# Patient Record
Sex: MALE | Race: WHITE | NOT HISPANIC OR LATINO | Employment: FULL TIME | URBAN - METROPOLITAN AREA
[De-identification: names, ages, dates, MRNs, and addresses within clinical notes are randomized per-mention and may not be internally consistent; named-entity substitution may affect disease eponyms.]

---

## 2023-02-24 PROBLEM — M25.552 BILATERAL HIP PAIN: Status: ACTIVE | Noted: 2023-02-24

## 2023-02-24 PROBLEM — L50.0 ALLERGIC URTICARIA: Status: ACTIVE | Noted: 2023-02-24

## 2023-02-24 PROBLEM — M25.551 BILATERAL HIP PAIN: Status: ACTIVE | Noted: 2023-02-24

## 2023-02-24 PROBLEM — Z78.9 KNOWN MEDICAL PROBLEMS: Status: ACTIVE | Noted: 2023-02-24

## 2023-02-24 PROBLEM — M70.30 BURSITIS OF ELBOW: Status: ACTIVE | Noted: 2023-02-24

## 2023-02-24 PROBLEM — L50.9 URTICARIA: Status: ACTIVE | Noted: 2023-02-24

## 2023-02-24 PROBLEM — M70.62 TROCHANTERIC BURSITIS OF LEFT HIP: Status: ACTIVE | Noted: 2023-02-24

## 2023-02-24 RX ORDER — MELOXICAM 15 MG/1
15 TABLET ORAL DAILY
COMMUNITY
End: 2023-09-18 | Stop reason: ALTCHOICE

## 2023-03-20 ENCOUNTER — OFFICE VISIT (OUTPATIENT)
Dept: PRIMARY CARE | Facility: CLINIC | Age: 63
End: 2023-03-20
Payer: COMMERCIAL

## 2023-03-20 VITALS
DIASTOLIC BLOOD PRESSURE: 74 MMHG | WEIGHT: 282 LBS | HEART RATE: 76 BPM | BODY MASS INDEX: 38.19 KG/M2 | HEIGHT: 72 IN | TEMPERATURE: 98.2 F | OXYGEN SATURATION: 96 % | SYSTOLIC BLOOD PRESSURE: 122 MMHG | RESPIRATION RATE: 16 BRPM

## 2023-03-20 DIAGNOSIS — E78.2 MIXED HYPERLIPIDEMIA: Primary | ICD-10-CM

## 2023-03-20 DIAGNOSIS — F17.210 CIGARETTE NICOTINE DEPENDENCE WITHOUT COMPLICATION: ICD-10-CM

## 2023-03-20 DIAGNOSIS — M70.62 TROCHANTERIC BURSITIS OF LEFT HIP: ICD-10-CM

## 2023-03-20 DIAGNOSIS — M16.12 PRIMARY OSTEOARTHRITIS OF LEFT HIP: ICD-10-CM

## 2023-03-20 PROCEDURE — 99213 OFFICE O/P EST LOW 20 MIN: CPT | Performed by: FAMILY MEDICINE

## 2023-03-20 PROCEDURE — 4004F PT TOBACCO SCREEN RCVD TLK: CPT | Performed by: FAMILY MEDICINE

## 2023-03-20 RX ORDER — NABUMETONE 750 MG/1
750 TABLET, FILM COATED ORAL
Qty: 60 TABLET | Refills: 0 | Status: SHIPPED | OUTPATIENT
Start: 2023-03-20 | End: 2023-04-19

## 2023-03-20 ASSESSMENT — ENCOUNTER SYMPTOMS
NUMBNESS: 1
LOSS OF MOTION: 0
LOSS OF SENSATION: 0
TINGLING: 0
INABILITY TO BEAR WEIGHT: 0
HIP PAIN: 1
MUSCLE WEAKNESS: 0

## 2023-03-20 ASSESSMENT — PAIN SCALES - GENERAL: PAINLEVEL: 2

## 2023-03-20 ASSESSMENT — PATIENT HEALTH QUESTIONNAIRE - PHQ9
SUM OF ALL RESPONSES TO PHQ9 QUESTIONS 1 AND 2: 0
2. FEELING DOWN, DEPRESSED OR HOPELESS: NOT AT ALL
1. LITTLE INTEREST OR PLEASURE IN DOING THINGS: NOT AT ALL

## 2023-03-20 NOTE — PROGRESS NOTES
Conrado Parker is a 62 y.o. male who presents for Follow up on Hip Pain and Lab Results.    Hip Pain   The pain is present in the left hip. The quality of the pain is described as shooting. The pain is at a severity of 9/10. The pain has been Fluctuating since onset. Associated symptoms include numbness. Pertinent negatives include no inability to bear weight, loss of motion, loss of sensation, muscle weakness or tingling.      No past medical history on file.  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    Urticaria 02/24/2023    Bilateral hip pain 02/24/2023    Known medical problems 02/24/2023    Trochanteric bursitis of left hip 02/24/2023     Past Surgical History:   Procedure Laterality Date    OTHER SURGICAL HISTORY  05/28/2021    No history of surgery       Social History     Socioeconomic History    Marital status:      Spouse name: None    Number of children: None    Years of education: None    Highest education level: None   Occupational History    None   Tobacco Use    Smoking status: Every Day     Packs/day: 0.50     Types: Cigarettes    Smokeless tobacco: Never   Vaping Use    Vaping status: None   Substance and Sexual Activity    Alcohol use: Never    Drug use: Never    Sexual activity: None   Other Topics Concern    None   Social History Narrative    None     Social Determinants of Health     Financial Resource Strain: Not on file   Food Insecurity: Not on file   Transportation Needs: Not on file   Physical Activity: Not on file   Stress: Not on file   Social Connections: Not on file   Intimate Partner Violence: Not on file   Housing Stability: Not on file     Current Outpatient Medications   Medication Sig Dispense Refill    meloxicam (Mobic) 15 mg tablet Take 1 tablet (15 mg) by mouth once daily.      nabumetone (Relafen) 750 mg tablet Take 1 tablet (750 mg) by mouth in the morning  and 1 tablet (750 mg) in the evening. Take with meals. 60 tablet 0     No current facility-administered medications for this visit.     Current Outpatient Medications on File Prior to Visit   Medication Sig Dispense Refill    meloxicam (Mobic) 15 mg tablet Take 1 tablet (15 mg) by mouth once daily.       No current facility-administered medications on file prior to visit.     No Known Allergies    Review of Systems - General ROS: negative for - fatigue, fever, malaise, night sweats or weight loss  ENT ROS: negative for - hearing change, nasal discharge, oral lesions, sinus pain, sore throat, tinnitus or vertigo  Allergy and Immunology ROS: negative for - hives, nasal congestion or seasonal allergies  Respiratory ROS: negative for - cough, hemoptysis, pleuritic pain, shortness of breath or wheezing  Cardiovascular ROS: no chest pain or dyspnea on exertion, palpitations, PND or orthopnea.  No syncope.  Gastrointestinal ROS: no abdominal pain, change in bowel habits, or black or bloody stools  Genito-Urinary ROS: no dysuria, trouble voiding, or hematuria  Dermatological ROS: negative for - dry skin, lumps, pruritus or rash  Neurological ROS: negative for - dizziness, gait disturbance, headaches, impaired coordination/balance, numbness/tingling, tremors or visual changes  Psychological ROS: negative for - anxiety, concentration difficulties, depression, memory difficulties or sleep disturbances  Endocrine ROS: negative for - hot flashes, malaise/lethargy, palpitations, polydipsia/polyuria, skin changes, temperature intolerance   Hematological and Lymphatic ROS: negative for - bruising, night sweats or pallor    Blood pressure 122/74, pulse 76, temperature 36.8 °C (98.2 °F), resp. rate 16, height 1.829 m (6'), weight 128 kg (282 lb), SpO2 96 %.    Physical Examination: General appearance - alert, well appearing, and in no distress  HEENT EOMI, PEERLA, normal eyelids.  Oropharynx no erythema or exudate.  Normal tonsils.     Ears -external auditory canal normal bilaterally.  Tympanic membranes normal bilaterally.  Mouth - mucous membranes moist, pharynx normal without lesions  Neck - supple, trachea central no thyromegaly.  No significant adenopathy  Chest -good air entry bilaterally, no rhonchi rales and no wheezes.  Heart -normal S1 and S2, regular rate and rhythm with no murmurs gallop or rub.  Abdomen -nondistended, soft, nontender with no guarding, no palpable mass and no CVA tenderness.  Bowel sounds normal.  No hernia.  Neurological - alert, oriented, cranial nerves II through XII normal.  Reflexes 2+ bilaterally.  No focal deficit.  Extremities: peripheral pulses normal, no clubbing or cyanosis.  Right hip:  normal   Left hip:  positives: pain elicited with heel impact, pain with movement of hip, and tenderness over greater trochanter and negatives: pulses full       Legacy Encounter on 02/16/2023   Component Date Value Ref Range Status    Glucose 02/16/2023 96  74 - 99 mg/dL Final    Sodium 02/16/2023 138  136 - 145 mmol/L Final    Potassium 02/16/2023 4.0  3.5 - 5.3 mmol/L Final    Chloride 02/16/2023 104  98 - 107 mmol/L Final    Bicarbonate 02/16/2023 26  21 - 32 mmol/L Final    Anion Gap 02/16/2023 12  10 - 20 mmol/L Final    Urea Nitrogen 02/16/2023 16  6 - 23 mg/dL Final    Creatinine 02/16/2023 1.07  0.50 - 1.30 mg/dL Final    GFR MALE 02/16/2023 79  >90 mL/min/1.73m2 Final    Comment:  CALCULATIONS OF ESTIMATED GFR ARE PERFORMED   USING THE 2021 CKD-EPI STUDY REFIT EQUATION   WITHOUT THE RACE VARIABLE FOR THE IDMS-TRACEABLE   CREATININE METHODS.    https://jasn.asnjournals.org/content/early/2021/09/22/ASN.5910430092      Calcium 02/16/2023 9.1  8.6 - 10.3 mg/dL Final    Albumin 02/16/2023 4.0  3.4 - 5.0 g/dL Final    Alkaline Phosphatase 02/16/2023 70  33 - 136 U/L Final    Total Protein 02/16/2023 7.2  6.4 - 8.2 g/dL Final    AST 02/16/2023 20  9 - 39 U/L Final    Total Bilirubin 02/16/2023 0.6  0.0 - 1.2 mg/dL Final     ALT (SGPT) 02/16/2023 20  10 - 52 U/L Final    Comment:  Patients treated with Sulfasalazine may generate    falsely decreased results for ALT.      WBC 02/16/2023 10.2  4.4 - 11.3 x10E9/L Final    RBC 02/16/2023 5.73  4.50 - 5.90 x10E12/L Final    Hemoglobin 02/16/2023 16.6  13.5 - 17.5 g/dL Final    Hematocrit 02/16/2023 52.5 (H)  41.0 - 52.0 % Final    MCV 02/16/2023 92  80 - 100 fL Final    MCHC 02/16/2023 31.6 (L)  32.0 - 36.0 g/dL Final    Platelets 02/16/2023 253  150 - 450 x10E9/L Final    RDW 02/16/2023 13.7  11.5 - 14.5 % Final    Neutrophils % 02/16/2023 54.7  40.0 - 80.0 % Final    Immature Granulocytes %, Automated 02/16/2023 0.4  0.0 - 0.9 % Final    Comment:  Immature Granulocyte Count (IG) includes promyelocytes,    myelocytes and metamyelocytes but does not include bands.   Percent differential counts (%) should be interpreted in the   context of the absolute cell counts (cells/L).      Lymphocytes % 02/16/2023 36.0  13.0 - 44.0 % Final    Monocytes % 02/16/2023 6.8  2.0 - 10.0 % Final    Eosinophils % 02/16/2023 1.6  0.0 - 6.0 % Final    Basophils % 02/16/2023 0.5  0.0 - 2.0 % Final    Neutrophils Absolute 02/16/2023 5.60  1.20 - 7.70 x10E9/L Final    Lymphocytes Absolute 02/16/2023 3.69  1.20 - 4.80 x10E9/L Final    Monocytes Absolute 02/16/2023 0.70  0.10 - 1.00 x10E9/L Final    Eosinophils Absolute 02/16/2023 0.16  0.00 - 0.70 x10E9/L Final    Basophils Absolute 02/16/2023 0.05  0.00 - 0.10 x10E9/L Final    Prostate Specific Antigen,Screen 02/16/2023 0.84  0.00 - 4.00 ng/mL Final    Comment: The FDA requires that the method used for PSA assay be   reported to the physician. Values obtained with different   assay methods must not be used interchangeably. This test  was performed at [facility] using the Access   Hybritech PSA assay is a two-site immunoenzymatic sandwich   assay. The assay is approved for measurement of   prostate-specific antigen (PSA)in serum and may be used   in  conjunction with a digital rectal examination in men   50 years and older as an aid in detection of prostate   cancer.  5-Alpha-reductase inhibitors (e.g. Proscar, Finasteride,   Avodart, Dutasteride and Anita) for the treatment of BPH   have been shown to lower PSA levels by an average of 50%   after 6 months of treatment.      Cholesterol 02/16/2023 213 (H)  0 - 199 mg/dL Final    Comment: .      AGE      DESIRABLE   BORDERLINE HIGH   HIGH     0-19 Y     0 - 169       170 - 199     >/= 200    20-24 Y     0 - 189       190 - 224     >/= 225         >24 Y     0 - 199       200 - 239     >/= 240   **All ranges are based on fasting samples. Specific   therapeutic targets will vary based on patient-specific   cardiac risk.  .   Pediatric guidelines reference:Pediatrics 2011, 128(S5).   Adult guidelines reference: NCEP ATPIII Guidelines,     EVA 2001, 258:3666-97  .   Venipuncture immediately after or during the    administration of Metamizole may lead to falsely   low results. Testing should be performed immediately   prior to Metamizole dosing.      HDL 02/16/2023 43.8  mg/dL Final    Comment: .      AGE      VERY LOW   LOW     NORMAL    HIGH       0-19 Y       < 35   < 40     40-45     ----    20-24 Y       ----   < 40       >45     ----      >24 Y       ----   < 40     40-60      >60  .      Cholesterol/HDL Ratio 02/16/2023 4.9   Final    Comment: REF VALUES  DESIRABLE  < 3.4  HIGH RISK  > 5.0      LDL 02/16/2023 146 (H)  0 - 99 mg/dL Final    Comment: .                           NEAR      BORD      AGE      DESIRABLE  OPTIMAL    HIGH     HIGH     VERY HIGH     0-19 Y     0 - 109     ---    110-129   >/= 130     ----    20-24 Y     0 - 119     ---    120-159   >/= 160     ----      >24 Y     0 -  99   100-129  130-159   160-189     >/=190  .      VLDL 02/16/2023 23  0 - 40 mg/dL Final    Triglycerides 02/16/2023 114  0 - 149 mg/dL Final    Comment: .      AGE      DESIRABLE   BORDERLINE HIGH   HIGH     VERY HIGH    0 D-90 D    19 - 174         ----         ----        ----  91 D- 9 Y     0 -  74        75 -  99     >/= 100      ----    10-19 Y     0 -  89        90 - 129     >/= 130      ----    20-24 Y     0 - 114       115 - 149     >/= 150      ----         >24 Y     0 - 149       150 - 199    200- 499    >/= 500  .   Venipuncture immediately after or during the    administration of Metamizole may lead to falsely   low results. Testing should be performed immediately   prior to Metamizole dosing.       Problem List Items Addressed This Visit       Trochanteric bursitis of left hip    Current Assessment & Plan     1.  I explained the condition to the patient.  2.  All questions answered.   3.  We discussed the option of using a steroid injection to settle the inflammation.  4.  I recommended regular icing for 20 min, three times per day.    5.  I offered the use of anti-inflammatories relafen  6.  I gave the patient a handout on this condition and instructed them on the exercises.  Increasing flexibility should help take the tension off of the iliotibial band as it crosses the greater trochanter.   7.  Follow up in a few weeks and as needed.         Relevant Medications    nabumetone (Relafen) 750 mg tablet    Other Relevant Orders    Referral to Orthopaedic Surgery    Mixed hyperlipidemia - Primary    Current Assessment & Plan     The nature of cardiac risk has been fully discussed with this patient. I have made  aware of  LDL target goal given  cardiovascular risk analysis. I have discussed the appropriate diet. The need for lifelong compliance in order to reduce risk is stressed. A regular exercise program is recommended to help achieve and maintain normal body weight, fitness and improve lipid balance. A written copy of a low fat, low cholesterol diet has been given to the patient.  Patient education provided. They understand and agree with this course of treatment. They will return with new or worsening symptoms.  Patient instructed to remain current with appropriate annual health maintenance.            Relevant Orders    Lipid Panel    Follow Up In [facility] - PCP     Other Visit Diagnoses       Primary osteoarthritis of left hip        Relevant Medications    nabumetone (Relafen) 750 mg tablet    Other Relevant Orders    Referral to Orthopaedic Surgery    Cigarette nicotine dependence without complication        Relevant Orders    CT lung screening low dose              Scribe Attestation  By signing my name below, IEugene Scribe   attest that this documentation has been prepared under the direction and in the presence of Win Overton MD.

## 2023-03-20 NOTE — ASSESSMENT & PLAN NOTE
The nature of cardiac risk has been fully discussed with this patient. I have made  aware of  LDL target goal given  cardiovascular risk analysis. I have discussed the appropriate diet. The need for lifelong compliance in order to reduce risk is stressed. A regular exercise program is recommended to help achieve and maintain normal body weight, fitness and improve lipid balance. A written copy of a low fat, low cholesterol diet has been given to the patient.  Patient education provided. They understand and agree with this course of treatment. They will return with new or worsening symptoms. Patient instructed to remain current with appropriate annual health maintenance.

## 2023-03-21 NOTE — ASSESSMENT & PLAN NOTE
1.  I explained the condition to the patient.  2.  All questions answered.   3.  We discussed the option of using a steroid injection to settle the inflammation.  4.  I recommended regular icing for 20 min, three times per day.    5.  I offered the use of anti-inflammatories relafen  6.  I gave the patient a handout on this condition and instructed them on the exercises.  Increasing flexibility should help take the tension off of the iliotibial band as it crosses the greater trochanter.   7.  Follow up in a few weeks and as needed.

## 2023-09-18 ENCOUNTER — OFFICE VISIT (OUTPATIENT)
Dept: PRIMARY CARE | Facility: CLINIC | Age: 63
End: 2023-09-18
Payer: COMMERCIAL

## 2023-09-18 VITALS
HEIGHT: 72 IN | HEART RATE: 87 BPM | BODY MASS INDEX: 39.68 KG/M2 | DIASTOLIC BLOOD PRESSURE: 72 MMHG | RESPIRATION RATE: 18 BRPM | SYSTOLIC BLOOD PRESSURE: 118 MMHG | WEIGHT: 293 LBS | OXYGEN SATURATION: 96 % | TEMPERATURE: 98.1 F

## 2023-09-18 DIAGNOSIS — E66.01 CLASS 2 SEVERE OBESITY DUE TO EXCESS CALORIES WITH SERIOUS COMORBIDITY AND BODY MASS INDEX (BMI) OF 39.0 TO 39.9 IN ADULT (MULTI): ICD-10-CM

## 2023-09-18 DIAGNOSIS — E78.2 MIXED HYPERLIPIDEMIA: ICD-10-CM

## 2023-09-18 DIAGNOSIS — M16.12 PRIMARY OSTEOARTHRITIS OF LEFT HIP: ICD-10-CM

## 2023-09-18 PROBLEM — E66.812 CLASS 2 SEVERE OBESITY DUE TO EXCESS CALORIES WITH SERIOUS COMORBIDITY AND BODY MASS INDEX (BMI) OF 39.0 TO 39.9 IN ADULT: Status: ACTIVE | Noted: 2023-09-18

## 2023-09-18 PROCEDURE — 3008F BODY MASS INDEX DOCD: CPT | Performed by: FAMILY MEDICINE

## 2023-09-18 PROCEDURE — 99213 OFFICE O/P EST LOW 20 MIN: CPT | Performed by: FAMILY MEDICINE

## 2023-09-18 PROCEDURE — 4004F PT TOBACCO SCREEN RCVD TLK: CPT | Performed by: FAMILY MEDICINE

## 2023-09-18 RX ORDER — DICLOFENAC SODIUM 75 MG/1
75 TABLET, DELAYED RELEASE ORAL 2 TIMES DAILY PRN
Qty: 60 TABLET | Refills: 1 | Status: SHIPPED | OUTPATIENT
Start: 2023-09-18

## 2023-09-18 ASSESSMENT — PATIENT HEALTH QUESTIONNAIRE - PHQ9
1. LITTLE INTEREST OR PLEASURE IN DOING THINGS: NOT AT ALL
SUM OF ALL RESPONSES TO PHQ9 QUESTIONS 1 AND 2: 0
2. FEELING DOWN, DEPRESSED OR HOPELESS: NOT AT ALL

## 2023-09-18 NOTE — ASSESSMENT & PLAN NOTE
Natural history and expected course discussed. Questions answered.  Educational materials distributed.  NSAIDs per medication orders.  The risks and benefits of my recommendations, as well as other treatment options were discussed with the patient today.  The risk including GI side effects like NSAIDs gastropathy and upper GI bleed and also kidney disease were discussed with him.  Call if symptoms fail to improve as expected.    Follow-up if persistent or worsening symptoms otherwise when necessary.

## 2023-09-18 NOTE — PATIENT INSTRUCTIONS
BMI was above normal measurement. Current weight: 133 kg (293 lb)  Weight change since last visit (-) denotes wt loss 11 lbs   Weight loss needed to achieve BMI 25: 109.1 Lbs  Weight loss needed to achieve BMI 30: 72.3 Lbs  Advised to Increase physical activity.

## 2023-09-18 NOTE — PROGRESS NOTES
Chief Complaint   Patient presents with    Follow-up     Hyperlipidemia    Hip Pain     He presents for follow up on Hyperlipidemia, and other medical conditions noted below. He indicates that he is feeling well and denies any symptoms referable to elevated cholesterol or other medical conditions. Specifically denies chest pain, palpitations, dyspnea, orthopnea, PND or peripheral edema.     Patient complains of left hip pain. Onset of the symptoms was several months ago. Inciting event:  unknown . The patient reports the hip pain is worse with weight bearing and is aggravated by walking. Associated symptoms: none. Aggravating symptoms include: any weight bearing, going up and down stairs, rising after sitting, and walking. Patient has had prior hip problems. Evaluation to date: plain films, which were abnormal  bursitis and arthritis  and an Orthopedics consult, PT Treatment to date:  cortisone .  He was referred to the orthopedic specialist and had cortisone injection with no improvement in his symptoms.  He was also prescribed medication with no improvement.  He does not want to return to the orthopedist.  He would like to try different anti-inflammatory medication.    History reviewed. No pertinent past medical history.  Patient Active Problem List    Diagnosis Date Noted    Primary osteoarthritis of left hip 09/18/2023    Class 2 severe obesity due to excess calories with serious comorbidity and body mass index (BMI) of 39.0 to 39.9 in adult (CMS/[facility]) 09/18/2023    Mixed hyperlipidemia 03/20/2023    Bursitis of elbow 02/24/2023    Allergic urticaria 02/24/2023    Urticaria 02/24/2023    Bilateral hip pain 02/24/2023    Known medical problems 02/24/2023    Trochanteric bursitis of left hip 02/24/2023     Past Surgical History:   Procedure Laterality Date    OTHER SURGICAL HISTORY  05/28/2021    No history of surgery     No family history on file.    Social History     Socioeconomic History    Marital status:       Spouse name: None    Number of children: None    Years of education: None    Highest education level: None   Occupational History    None   Tobacco Use    Smoking status: Every Day     Packs/day: .5     Types: Cigarettes    Smokeless tobacco: Never   Vaping Use    Vaping Use: None   Substance and Sexual Activity    Alcohol use: Never    Drug use: Never    Sexual activity: Defer   Other Topics Concern    None   Social History Narrative    None     Social Determinants of Health     Financial Resource Strain: Not on file   Food Insecurity: Not on file   Transportation Needs: Not on file   Physical Activity: Not on file   Stress: Not on file   Social Connections: Not on file   Intimate Partner Violence: Not on file   Housing Stability: Not on file     Current Outpatient Medications   Medication Sig Dispense Refill    diclofenac (Voltaren) 75 mg EC tablet Take 1 tablet (75 mg) by mouth 2 times a day as needed (pain with food). Take with food, do not crush, chew, or split. 60 tablet 1     No current facility-administered medications for this visit.     Current Outpatient Medications on File Prior to Visit   Medication Sig Dispense Refill    [DISCONTINUED] meloxicam (Mobic) 15 mg tablet Take 1 tablet (15 mg) by mouth once daily.       No current facility-administered medications on file prior to visit.     No Known Allergies       Review of Systems - General ROS: negative for - fatigue, fever, malaise, night sweats or weight loss  ENT ROS: negative for - hearing change, nasal discharge, oral lesions, sinus pain, sore throat, tinnitus or vertigo  Allergy and Immunology ROS: negative for - hives, nasal congestion or seasonal allergies  Respiratory ROS: negative for - cough, hemoptysis, pleuritic pain, shortness of breath or wheezing  Cardiovascular ROS: no chest pain or dyspnea on exertion, palpitations, PND or orthopnea.  No syncope.  Gastrointestinal ROS: no abdominal pain, change in bowel habits, or black or  bloody stools  Genito-Urinary ROS: no dysuria, trouble voiding, or hematuria  Dermatological ROS: negative for - dry skin, lumps, pruritus or rash  Neurological ROS: negative for - dizziness, gait disturbance, headaches, impaired coordination/balance, numbness/tingling, tremors or visual changes  Hematological and Lymphatic ROS: negative for - bruising, night sweats or pallor    Blood pressure 118/72, pulse 87, temperature 36.7 °C (98.1 °F), resp. rate 18, height 1.829 m (6'), weight 133 kg (293 lb), SpO2 96 %.    Physical Examination: General appearance - alert, well appearing, and in no distress  HEENT EOMI, PEERLA, normal eyelids.  Oropharynx no erythema or exudate.  Normal tonsils.    Ears -external auditory canal normal bilaterally.  Tympanic membranes normal bilaterally.  Mouth - mucous membranes moist, pharynx normal without lesions  Neck - supple, trachea central no thyromegaly.  No significant adenopathy  Chest -good air entry bilaterally, no rhonchi rales and no wheezes.  Heart -normal S1 and S2, regular rate and rhythm with no murmurs gallop or rub.  Abdomen -nondistended, soft, nontender with no guarding, no palpable mass and no CVA tenderness.  Bowel sounds normal.  No hernia.  Extremities: peripheral pulses normal, no clubbing or cyanosis.  Right hip:  full painless range of motion, without tenderness   Left hip:  positives: decreased ROM, pain elicited with heel impact, and pain with movement of hip and negatives: no tenderness           Legacy Encounter on 02/16/2023   Component Date Value Ref Range Status    Glucose 02/16/2023 96  74 - 99 mg/dL Final    Sodium 02/16/2023 138  136 - 145 mmol/L Final    Potassium 02/16/2023 4.0  3.5 - 5.3 mmol/L Final    Chloride 02/16/2023 104  98 - 107 mmol/L Final    Bicarbonate 02/16/2023 26  21 - 32 mmol/L Final    Anion Gap 02/16/2023 12  10 - 20 mmol/L Final    Urea Nitrogen 02/16/2023 16  6 - 23 mg/dL Final    Creatinine 02/16/2023 1.07  0.50 - 1.30 mg/dL  Final    GFR MALE 02/16/2023 79  >90 mL/min/1.73m2 Final    Comment:  CALCULATIONS OF ESTIMATED GFR ARE PERFORMED   USING THE 2021 CKD-EPI STUDY REFIT EQUATION   WITHOUT THE RACE VARIABLE FOR THE IDMS-TRACEABLE   CREATININE METHODS.    https://jasn.asnjournals.org/content/early/2021/09/22/ASN.3276787518      Calcium 02/16/2023 9.1  8.6 - 10.3 mg/dL Final    Albumin 02/16/2023 4.0  3.4 - 5.0 g/dL Final    Alkaline Phosphatase 02/16/2023 70  33 - 136 U/L Final    Total Protein 02/16/2023 7.2  6.4 - 8.2 g/dL Final    AST 02/16/2023 20  9 - 39 U/L Final    Total Bilirubin 02/16/2023 0.6  0.0 - 1.2 mg/dL Final    ALT (SGPT) 02/16/2023 20  10 - 52 U/L Final    Comment:  Patients treated with Sulfasalazine may generate    falsely decreased results for ALT.      WBC 02/16/2023 10.2  4.4 - 11.3 x10E9/L Final    RBC 02/16/2023 5.73  4.50 - 5.90 x10E12/L Final    Hemoglobin 02/16/2023 16.6  13.5 - 17.5 g/dL Final    Hematocrit 02/16/2023 52.5 (H)  41.0 - 52.0 % Final    MCV 02/16/2023 92  80 - 100 fL Final    MCHC 02/16/2023 31.6 (L)  32.0 - 36.0 g/dL Final    Platelets 02/16/2023 253  150 - 450 x10E9/L Final    RDW 02/16/2023 13.7  11.5 - 14.5 % Final    Neutrophils % 02/16/2023 54.7  40.0 - 80.0 % Final    Immature Granulocytes %, Automated 02/16/2023 0.4  0.0 - 0.9 % Final    Comment:  Immature Granulocyte Count (IG) includes promyelocytes,    myelocytes and metamyelocytes but does not include bands.   Percent differential counts (%) should be interpreted in the   context of the absolute cell counts (cells/L).      Lymphocytes % 02/16/2023 36.0  13.0 - 44.0 % Final    Monocytes % 02/16/2023 6.8  2.0 - 10.0 % Final    Eosinophils % 02/16/2023 1.6  0.0 - 6.0 % Final    Basophils % 02/16/2023 0.5  0.0 - 2.0 % Final    Neutrophils Absolute 02/16/2023 5.60  1.20 - 7.70 x10E9/L Final    Lymphocytes Absolute 02/16/2023 3.69  1.20 - 4.80 x10E9/L Final    Monocytes Absolute 02/16/2023 0.70  0.10 - 1.00 x10E9/L Final    Eosinophils  Absolute 02/16/2023 0.16  0.00 - 0.70 x10E9/L Final    Basophils Absolute 02/16/2023 0.05  0.00 - 0.10 x10E9/L Final    Prostate Specific Antigen,Screen 02/16/2023 0.84  0.00 - 4.00 ng/mL Final    Comment: The FDA requires that the method used for PSA assay be   reported to the physician. Values obtained with different   assay methods must not be used interchangeably. This test  was performed at [facility] using the Access   Hybritech PSA assay is a two-site immunoenzymatic sandwich   assay. The assay is approved for measurement of   prostate-specific antigen (PSA)in serum and may be used   in conjunction with a digital rectal examination in men   50 years and older as an aid in detection of prostate   cancer.  5-Alpha-reductase inhibitors (e.g. Proscar, Finasteride,   Avodart, Dutasteride and Anita) for the treatment of BPH   have been shown to lower PSA levels by an average of 50%   after 6 months of treatment.      Cholesterol 02/16/2023 213 (H)  0 - 199 mg/dL Final    Comment: .      AGE      DESIRABLE   BORDERLINE HIGH   HIGH     0-19 Y     0 - 169       170 - 199     >/= 200    20-24 Y     0 - 189       190 - 224     >/= 225         >24 Y     0 - 199       200 - 239     >/= 240   **All ranges are based on fasting samples. Specific   therapeutic targets will vary based on patient-specific   cardiac risk.  .   Pediatric guidelines reference:Pediatrics 2011, 128(S5).   Adult guidelines reference: NCEP ATPIII Guidelines,     EVA 2001, 258:2486-97  .   Venipuncture immediately after or during the    administration of Metamizole may lead to falsely   low results. Testing should be performed immediately   prior to Metamizole dosing.      HDL 02/16/2023 43.8  mg/dL Final    Comment: .      AGE      VERY LOW   LOW     NORMAL    HIGH       0-19 Y       < 35   < 40     40-45     ----    20-24 Y       ----   < 40       >45     ----      >24 Y       ----   < 40     40-60      >60  .      Cholesterol/HDL  Ratio 02/16/2023 4.9   Final    Comment: REF VALUES  DESIRABLE  < 3.4  HIGH RISK  > 5.0      LDL 02/16/2023 146 (H)  0 - 99 mg/dL Final    Comment: .                           NEAR      BORD      AGE      DESIRABLE  OPTIMAL    HIGH     HIGH     VERY HIGH     0-19 Y     0 - 109     ---    110-129   >/= 130     ----    20-24 Y     0 - 119     ---    120-159   >/= 160     ----      >24 Y     0 -  99   100-129  130-159   160-189     >/=190  .      VLDL 02/16/2023 23  0 - 40 mg/dL Final    Triglycerides 02/16/2023 114  0 - 149 mg/dL Final    Comment: .      AGE      DESIRABLE   BORDERLINE HIGH   HIGH     VERY HIGH   0 D-90 D    19 - 174         ----         ----        ----  91 D- 9 Y     0 -  74        75 -  99     >/= 100      ----    10-19 Y     0 -  89        90 - 129     >/= 130      ----    20-24 Y     0 - 114       115 - 149     >/= 150      ----         >24 Y     0 - 149       150 - 199    200- 499    >/= 500  .   Venipuncture immediately after or during the    administration of Metamizole may lead to falsely   low results. Testing should be performed immediately   prior to Metamizole dosing.         Problem List Items Addressed This Visit       Mixed hyperlipidemia    Current Assessment & Plan     The nature of cardiac risk has been fully discussed with this patient. Discussed cardiovascular risk analysis and appropriate diet with the need for lifelong measures to reduce the risk. A regular exercise program is recommended to help achieve and maintain normal body weight, fitness and improve lipid balance. Patient education provided. They understand and agree with this course of treatment. They will return with new or worsening symptoms. Patient instructed to remain current with appropriate annual health maintenance.            Primary osteoarthritis of left hip    Current Assessment & Plan     Natural history and expected course discussed. Questions answered.  Educational materials distributed.  NSAIDs per  medication orders.  The risks and benefits of my recommendations, as well as other treatment options were discussed with the patient today.  The risk including GI side effects like NSAIDs gastropathy and upper GI bleed and also kidney disease were discussed with him.  Call if symptoms fail to improve as expected.    Follow-up if persistent or worsening symptoms otherwise when necessary.             Relevant Medications    diclofenac (Voltaren) 75 mg EC tablet    Class 2 severe obesity due to excess calories with serious comorbidity and body mass index (BMI) of 39.0 to 39.9 in adult (CMS/[facility])    Current Assessment & Plan     Continue decrease calorie diet and not more than 1500 calorie per day diet and low-fat diet.  Continue with regular exercise program.  We advised exercise at least 5 days a week for at least 45 minutes and also a minimum of 10,000 steps a day.  The detrimental effects of obesity on health were discussed.          Scribe Attestation  By signing my name below, IEugene Scribe   attest that this documentation has been prepared under the direction and in the presence of Win Overton MD.

## 2023-11-28 ENCOUNTER — TELEPHONE (OUTPATIENT)
Dept: PRIMARY CARE | Facility: CLINIC | Age: 63
End: 2023-11-28
Payer: COMMERCIAL

## 2023-11-28 DIAGNOSIS — M70.62 TROCHANTERIC BURSITIS OF LEFT HIP: ICD-10-CM

## 2023-11-28 DIAGNOSIS — M16.12 PRIMARY OSTEOARTHRITIS OF LEFT HIP: ICD-10-CM

## 2023-11-28 NOTE — TELEPHONE ENCOUNTER
Umer called and said he's done PT and his left hip has only gotten worse and he was wondering If he can start the process for surgery and wanted to know if dr. Overton could writte him a referral and if he had any in mind. He knows of a  In [place] at a  office but he can't remember the drs name. He's going to call back with the drs. name

## 2023-12-20 ENCOUNTER — OFFICE VISIT (OUTPATIENT)
Dept: ORTHOPEDIC SURGERY | Facility: CLINIC | Age: 63
End: 2023-12-20
Payer: COMMERCIAL

## 2023-12-20 ENCOUNTER — ANCILLARY PROCEDURE (OUTPATIENT)
Dept: RADIOLOGY | Facility: CLINIC | Age: 63
End: 2023-12-20
Payer: COMMERCIAL

## 2023-12-20 DIAGNOSIS — M16.12 PRIMARY OSTEOARTHRITIS OF LEFT HIP: Primary | ICD-10-CM

## 2023-12-20 DIAGNOSIS — M16.12 PRIMARY OSTEOARTHRITIS OF LEFT HIP: ICD-10-CM

## 2023-12-20 DIAGNOSIS — M70.62 TROCHANTERIC BURSITIS OF LEFT HIP: ICD-10-CM

## 2023-12-20 PROCEDURE — 99204 OFFICE O/P NEW MOD 45 MIN: CPT | Performed by: ORTHOPAEDIC SURGERY

## 2023-12-20 PROCEDURE — 3008F BODY MASS INDEX DOCD: CPT | Performed by: ORTHOPAEDIC SURGERY

## 2023-12-20 PROCEDURE — 73502 X-RAY EXAM HIP UNI 2-3 VIEWS: CPT | Mod: LT

## 2023-12-20 PROCEDURE — 73502 X-RAY EXAM HIP UNI 2-3 VIEWS: CPT | Mod: LEFT SIDE | Performed by: ORTHOPAEDIC SURGERY

## 2023-12-20 PROCEDURE — 99214 OFFICE O/P EST MOD 30 MIN: CPT | Performed by: ORTHOPAEDIC SURGERY

## 2023-12-20 PROCEDURE — 4004F PT TOBACCO SCREEN RCVD TLK: CPT | Performed by: ORTHOPAEDIC SURGERY

## 2023-12-21 NOTE — PROGRESS NOTES
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  He denies any numbness or tingling denies any locking popping clicking grinding    Location of pain: Some in the groin some along the lateral aspect of his left hip  Quality of pain: Chronic pain for several years some days better than others  Modifying factors: Worse when he tries to twist or pivot or with rotation of his hip  Associated signs and symptoms: Denies any numbness or tingling  Previous treatment: History of a bursal injection in the past with no improvement he also has done physical therapy which seem to make the symptoms worse        The patient's past medical history, family history, social history, and review of systems were documented on the patient's medical intake form.  The medical intake form was reviewed and scanned into the electronic medical record for future use.  History is otherwise negative except as stated in the HPI.    Physical exam    General: Alert and oriented to place, person, and time.  No acute distress and breathing comfortably; pleasant and cooperative with the examination.  HEENT: Head is normocephalic and atraumatic.  Neck: Supple, no visible swelling.  Cardiovascular: Good perfusion to the affected extremity.  Lungs: No audible wheezing or labored breathing.  Abdomen: Nondistended  HEME/Lymph : No visible abnormalities bilateral lower extremity    Extremity:  The affected hip was examined and inspected and was tender to touch along the groin and lateral bursal  area.  The hip joint occasionally displayed catching, locking, and mechanical symptoms.  The skin was intact without breakdown or open wounds.  There was some mild crepitus seen with hip internal and external range of motion without evidence of instability.  Inspection of the low back showed normal curvature and integrity of the skin.  Strength and stability of the low back muscles and ligaments were within normal limits.  There was a negative straight leg raise test with no foot drop, numbness, or tingling in both lower extremities.  Sensation, reflexes, and pulses in the foot and ankle are preserved.  There was no obvious effusion.  Range of motion showed flexion and internal and external rotation were all limited with pain.  The patient had the ability to bear weight, but with discomfort.  The patient's gait was antalgic secondary to the discomfort.    Diagnostics:      XR hip left with pelvis when performed 2 or 3 views    Result Date: 12/20/2023  Interpreted By:  Marya Tavarez, STUDY: XR HIP LEFT WITH PELVIS WHEN PERFORMED 2 OR 3 VIEWS;  ;  12/20/2023 3:30 pm   INDICATION: Signs/Symptoms:pain.   ACCESSION NUMBER(S): [id number]   ORDERING CLINICIAN: MARYA TAVAREZ   FINDINGS: AP pelvis and AP and lateral view of left hip moderate osteophyte formation mild joint space narrowing no evidence of fracture dislocation or other bony abnormality     Signed by: Marya Tavarez 12/20/2023 3:50 PM Dictation workstation:   [id number]         Procedures  [none   ]    Assessment:  Left-sided hip pain with combination of hip arthritis and trochanteric bursitis    Treatment plan:  1.  The natural history of the condition and its associated treatment alternatives including surgical and nonsurgical options were discussed with the patient at length.  2.  Patient with significant pain related left hip would like to try fluoro-guided steroid injection in the left hip or to go and set that up for him.  Patient  understands the cortisone may provide temporary relief how much it helps her how long it lasts is unpredictable.  Cortisone can be repeated after 3 months if symptoms return.  This will be for both diagnostic and therapeutic purposes as his x-rays do not show severe arthritis but his symptoms seem to point to his groin.  He does have a BMI 39 needs to do some work on weight loss as well.  He will follow-up with me after the injection to assess results.  3. [   ]  4.  All of the patient's questions were answered.    This note was prepared using voice recognition software.  The details of this note are correct and have been reviewed, and corrected to the best of my ability.  Some grammatical areas may persist related to the Dragon software    Jaylen Tavarez MD  Senior Attending Physician  [facility] [place]  [facility] [place]    [phone]

## 2024-01-16 ENCOUNTER — HOSPITAL ENCOUNTER (OUTPATIENT)
Dept: RADIOLOGY | Facility: HOSPITAL | Age: 64
Discharge: HOME | End: 2024-01-16
Payer: COMMERCIAL

## 2024-01-16 DIAGNOSIS — M16.12 PRIMARY OSTEOARTHRITIS OF LEFT HIP: ICD-10-CM

## 2024-01-16 PROCEDURE — 77002 NEEDLE LOCALIZATION BY XRAY: CPT | Mod: LEFT SIDE | Performed by: RADIOLOGY

## 2024-01-16 PROCEDURE — 20610 DRAIN/INJ JOINT/BURSA W/O US: CPT | Mod: LT

## 2024-01-16 PROCEDURE — 2550000001 HC RX 255 CONTRASTS: Performed by: ORTHOPAEDIC SURGERY

## 2024-01-16 PROCEDURE — 2500000005 HC RX 250 GENERAL PHARMACY W/O HCPCS: Performed by: ORTHOPAEDIC SURGERY

## 2024-01-16 PROCEDURE — 20610 DRAIN/INJ JOINT/BURSA W/O US: CPT | Mod: LEFT SIDE | Performed by: RADIOLOGY

## 2024-01-16 PROCEDURE — 2500000004 HC RX 250 GENERAL PHARMACY W/ HCPCS (ALT 636 FOR OP/ED): Performed by: ORTHOPAEDIC SURGERY

## 2024-01-16 RX ORDER — BUPIVACAINE HYDROCHLORIDE 5 MG/ML
INJECTION, SOLUTION EPIDURAL; INTRACAUDAL AS NEEDED
Status: COMPLETED | OUTPATIENT
Start: 2024-01-16 | End: 2024-01-16

## 2024-01-16 RX ORDER — METHYLPREDNISOLONE ACETATE 40 MG/ML
INJECTION, SUSPENSION INTRA-ARTICULAR; INTRALESIONAL; INTRAMUSCULAR; SOFT TISSUE AS NEEDED
Status: COMPLETED | OUTPATIENT
Start: 2024-01-16 | End: 2024-01-16

## 2024-01-16 RX ORDER — LIDOCAINE HYDROCHLORIDE 20 MG/ML
INJECTION, SOLUTION EPIDURAL; INFILTRATION; INTRACAUDAL; PERINEURAL AS NEEDED
Status: COMPLETED | OUTPATIENT
Start: 2024-01-16 | End: 2024-01-16

## 2024-01-16 RX ADMIN — IOHEXOL 3 ML: 300 INJECTION, SOLUTION INTRAVENOUS at 14:41

## 2024-01-16 RX ADMIN — METHYLPREDNISOLONE ACETATE 40 MG: 40 INJECTION, SUSPENSION INTRA-ARTICULAR; INTRALESIONAL; INTRAMUSCULAR; INTRASYNOVIAL; SOFT TISSUE at 14:28

## 2024-01-16 RX ADMIN — LIDOCAINE HYDROCHLORIDE 13 ML: 20 INJECTION, SOLUTION EPIDURAL; INFILTRATION; INTRACAUDAL; PERINEURAL at 14:29

## 2024-01-16 RX ADMIN — BUPIVACAINE HYDROCHLORIDE 3 ML: 5 INJECTION, SOLUTION EPIDURAL; INTRACAUDAL; PERINEURAL at 14:28

## 2024-07-12 ENCOUNTER — APPOINTMENT (OUTPATIENT)
Dept: ORTHOPEDIC SURGERY | Facility: CLINIC | Age: 64
End: 2024-07-12
Payer: COMMERCIAL

## 2024-07-12 DIAGNOSIS — M16.12 PRIMARY OSTEOARTHRITIS OF LEFT HIP: Primary | ICD-10-CM

## 2024-07-12 PROCEDURE — 99214 OFFICE O/P EST MOD 30 MIN: CPT | Performed by: ORTHOPAEDIC SURGERY

## 2024-07-12 PROCEDURE — 3008F BODY MASS INDEX DOCD: CPT | Performed by: ORTHOPAEDIC SURGERY

## 2024-07-12 ASSESSMENT — PAIN SCALES - GENERAL: PAINLEVEL_OUTOF10: 8

## 2024-07-12 ASSESSMENT — PAIN - FUNCTIONAL ASSESSMENT: PAIN_FUNCTIONAL_ASSESSMENT: 0-10

## 2024-07-12 NOTE — PROGRESS NOTES
Subjective    Patient ID: Conrado    Chief Complaint:   Chief Complaint   Patient presents with    Left Hip - Pain     Lt Hip OA w/ Troch Bursitis, Fluoro 1/16/24     History of present illness    63-year-old gentleman presented clinic today for follow-up evaluation left hip osteoarthritis.  He had a fluoroscopy guided steroid injection back in January which gave him maybe 1 day of mild relief in the left hip.  Most of his hip pain seems to be located over the lateral aspect of the left groin.  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  He has numbness and tingling but this is a chronic issue well before his hip became a problem.  No instability reported at this time.      Past medical , Surgical, Family and social history reviewed.      Physical exam  General: No acute distress and breathing comfortably.  Patient is pleasant and cooperative with the examination.    Extremity  Left hip is neurovascular intact.  The affected hip was examined and inspected and was tender to touch along the groin and lateral bursal area.  The hip joint occasionally displayed catching, locking, and mechanical symptoms.  The skin was intact without breakdown or open wounds.  There was some mild crepitus seen with hip internal and external range of motion without evidence of instability.  Inspection of the low back showed normal curvature and integrity of the skin.  Strength and stability of the low back muscles and ligaments were within normal limits.  There was a negative straight leg raise test with no foot drop, numbness, or tingling in both lower extremities.  Sensation, reflexes, and pulses in the foot and ankle are preserved.  There was no obvious effusion.  Range of motion showed flexion and internal and external rotation were all limited with pain.  The patient had the ability to bear weight, but with discomfort.  The  patient's gait was antalgic secondary to the discomfort.    Diagnostics  [ none]  No results found.     Procedure  [ none]    Assessment  Left hip osteoarthritis    Treatment plan  1.  At this time we had a long discussion regards to conservative or surgical intervention left hip.  The risks and benefits of surgery regarding left total hip replacement were discussed with patient he understands these and wishes to proceed with surgery.  2.  He will continue weightbearing activity as tolerated.  3.  Follow-up with us for his preop appointment just prior to surgery.  For complete plan and/or surgical details, please refer to Dr. Tavarez's portion of the split/shared dictation.  He will get new x-rays of his left hip at his preop visit.  4.  All of the patient's questions were answered.    Patient has failed all forms of conservative treatment. The joint pain is significantly affecting quality of life and activities of daily living.   The patient has pain in the joint that is increased with activity and weightbearing, and walks with an antalgic gait. The pain is interfering with activities of daily living. Patient has limited range of motion and pain with passive range of motion. X-rays demonstrate joint space narrowing, subchondral sclerosis and osteophyte formation. Symptoms are not improving with medication, physical therapy or supportive device for a period of at least 3 months.      Patient would like to go and schedule joint replacement surgery. The procedure its risks, benefits, and treatment alternatives were discussed at length and patient would like to proceed. Patient is going to schedule the procedure at their earliest convenience and see me back for a preop visit just prior. Preadmission testing, medical checkup, and insurance authorization will be performed in the meantime. Patient understands a joint replacement surgery is a last resort, although a very successful operation results are not  guaranteed.      No orders of the defined types were placed in this encounter.      This note was prepared using voice recognition software.  The details of this note are correct and have been reviewed, and corrected to the best of my ability.  Some grammatical areas may persist related to the Dragon software    Luis Diaz PA-C, [facility]  [facility] [place]    [phone]    In a face-to-face encounter performed today, I Jaylen Tavarez MD performed a history and physical examination, discussed pertinent diagnostic studies if indicated, and discussed diagnosis and management strategies with both the patient and the midlevel provider.  I reviewed the midlevel's note and agree with the documented findings and plan of care.  Greater than 50% of the evaluation and treatment decision was performed by the physician myself during today's visit.    63-year-old gentleman here for follow-up of his left hip he had a fluoro-guided injection back in January it helped maybe for 1 day he is having pain in the groin difficulty bearing weight difficulty standing for prolonged period time symptoms are getting progressively worse having severe impairment of bodily function.  On examination he ambulates with an antalgic gait he has Pain with internal ex rotation of the hip straight leg raise testing is negative.  Impression is DJD left hip.  Plan is left total hip replacement.  The procedures risk benefits treatment alternatives and postoperative course were discussed and he understands and wishes to proceed.  He does have some numbness and tingling on the left side I explained that this may or may not be improved with a total hip replacement hip replacement is reliable predictable first relief of his groin pain      Patient has severe impairment related to her presenting condition.  It is significantly impairing bodily function.  We did discuss surgical and nonsurgical options.    This note was prepared  using voice recognition software.  The details of this note are correct and have been reviewed, and corrected to the best of my ability.  Some grammatical areas may persist related to the Dragon software    Jaylen Tavarez MD  Senior Attending Physician  [facility]    [phone]

## 2024-09-06 PROBLEM — M16.12 UNILATERAL PRIMARY OSTEOARTHRITIS, LEFT HIP: Status: ACTIVE | Noted: 2024-09-05

## 2024-10-10 ENCOUNTER — PRE-ADMISSION TESTING (OUTPATIENT)
Dept: PREADMISSION TESTING | Facility: HOSPITAL | Age: 64
End: 2024-10-10
Payer: COMMERCIAL

## 2024-10-10 ENCOUNTER — HOSPITAL ENCOUNTER (OUTPATIENT)
Dept: CARDIOLOGY | Facility: HOSPITAL | Age: 64
Discharge: HOME | End: 2024-10-10
Payer: COMMERCIAL

## 2024-10-10 VITALS
HEIGHT: 72 IN | BODY MASS INDEX: 39.12 KG/M2 | DIASTOLIC BLOOD PRESSURE: 93 MMHG | WEIGHT: 288.8 LBS | HEART RATE: 68 BPM | SYSTOLIC BLOOD PRESSURE: 154 MMHG | OXYGEN SATURATION: 95 %

## 2024-10-10 DIAGNOSIS — M16.12 UNILATERAL PRIMARY OSTEOARTHRITIS, LEFT HIP: ICD-10-CM

## 2024-10-10 LAB
ALBUMIN SERPL BCP-MCNC: 4.1 G/DL (ref 3.4–5)
ALP SERPL-CCNC: 74 U/L (ref 33–136)
ALT SERPL W P-5'-P-CCNC: 14 U/L (ref 10–52)
ANION GAP SERPL CALC-SCNC: 12 MMOL/L (ref 10–20)
APTT PPP: 35 SECONDS (ref 27–38)
AST SERPL W P-5'-P-CCNC: 15 U/L (ref 9–39)
BASOPHILS # BLD AUTO: 0.09 X10*3/UL (ref 0–0.1)
BASOPHILS NFR BLD AUTO: 0.8 %
BILIRUB SERPL-MCNC: 0.5 MG/DL (ref 0–1.2)
BUN SERPL-MCNC: 14 MG/DL (ref 6–23)
CALCIUM SERPL-MCNC: 8.8 MG/DL (ref 8.6–10.3)
CHLORIDE SERPL-SCNC: 104 MMOL/L (ref 98–107)
CO2 SERPL-SCNC: 28 MMOL/L (ref 21–32)
CREAT SERPL-MCNC: 1.16 MG/DL (ref 0.5–1.3)
EGFRCR SERPLBLD CKD-EPI 2021: 71 ML/MIN/1.73M*2
EOSINOPHIL # BLD AUTO: 0.19 X10*3/UL (ref 0–0.7)
EOSINOPHIL NFR BLD AUTO: 1.7 %
ERYTHROCYTE [DISTWIDTH] IN BLOOD BY AUTOMATED COUNT: 13.5 % (ref 11.5–14.5)
EST. AVERAGE GLUCOSE BLD GHB EST-MCNC: 108 MG/DL
GLUCOSE SERPL-MCNC: 81 MG/DL (ref 74–99)
HBA1C MFR BLD: 5.4 %
HCT VFR BLD AUTO: 53.8 % (ref 41–52)
HGB BLD-MCNC: 17.5 G/DL (ref 13.5–17.5)
IMM GRANULOCYTES # BLD AUTO: 0.04 X10*3/UL (ref 0–0.7)
IMM GRANULOCYTES NFR BLD AUTO: 0.4 % (ref 0–0.9)
INR PPP: 1 (ref 0.9–1.1)
LYMPHOCYTES # BLD AUTO: 3.43 X10*3/UL (ref 1.2–4.8)
LYMPHOCYTES NFR BLD AUTO: 31.4 %
MCH RBC QN AUTO: 29.7 PG (ref 26–34)
MCHC RBC AUTO-ENTMCNC: 32.5 G/DL (ref 32–36)
MCV RBC AUTO: 91 FL (ref 80–100)
MONOCYTES # BLD AUTO: 0.78 X10*3/UL (ref 0.1–1)
MONOCYTES NFR BLD AUTO: 7.1 %
NEUTROPHILS # BLD AUTO: 6.4 X10*3/UL (ref 1.2–7.7)
NEUTROPHILS NFR BLD AUTO: 58.6 %
NRBC BLD-RTO: 0 /100 WBCS (ref 0–0)
PLATELET # BLD AUTO: 247 X10*3/UL (ref 150–450)
POTASSIUM SERPL-SCNC: 4.5 MMOL/L (ref 3.5–5.3)
PROT SERPL-MCNC: 6.8 G/DL (ref 6.4–8.2)
PROTHROMBIN TIME: 11.4 SECONDS (ref 9.8–12.8)
RBC # BLD AUTO: 5.89 X10*6/UL (ref 4.5–5.9)
SODIUM SERPL-SCNC: 139 MMOL/L (ref 136–145)
WBC # BLD AUTO: 10.9 X10*3/UL (ref 4.4–11.3)

## 2024-10-10 PROCEDURE — 36415 COLL VENOUS BLD VENIPUNCTURE: CPT

## 2024-10-10 PROCEDURE — 83036 HEMOGLOBIN GLYCOSYLATED A1C: CPT | Mod: ELYLAB

## 2024-10-10 PROCEDURE — 87081 CULTURE SCREEN ONLY: CPT | Mod: ELYLAB

## 2024-10-10 PROCEDURE — 85610 PROTHROMBIN TIME: CPT

## 2024-10-10 PROCEDURE — 93005 ELECTROCARDIOGRAM TRACING: CPT

## 2024-10-10 PROCEDURE — 80053 COMPREHEN METABOLIC PANEL: CPT

## 2024-10-10 PROCEDURE — 93010 ELECTROCARDIOGRAM REPORT: CPT | Performed by: INTERNAL MEDICINE

## 2024-10-10 PROCEDURE — 85025 COMPLETE CBC W/AUTO DIFF WBC: CPT

## 2024-10-10 NOTE — PREPROCEDURE INSTRUCTIONS
HIBICLENS SOAP AND JOINT SURGERY INSTRUCTION BOOK GIVEN TO AND REVIEWED WITH PT.  PT VERBALIZES UNDERSTANDING.  PT PROVIDED WITH LINKS TO  VIDEO AND HANDOUT ON WALKER/AIDES.   ADULT SURGERY PRE-OPERATIVE INSTRUCTIONS    You will receive notification one business day prior to your surgery to confirm your arrival time and any additional information between 2 P.M. - 5 P.M. It is important that you answer your phone and/or check your messages during this time. You may see in MyChart your surgery start time changes several times even up to the day before your procedure. Please disregard those times and only follow the time given by the  who will be notifying you via phone and not a text.  Please arrive at your scheduled time to avoid delay or cancelled surgery.    Please enter the building through either the Main Entrance in front of the hospital or from the Parking Garage Walkway Bridge. From the parking garage, which is free, take the 2nd Floor Walkway Bridge into the hospital and check in at the [facility] Outpatient Desk as you enter the hospital directly in front of you. If you enter through the Main Entrance, take the elevator off the lobby on the right labeled “A” to the 2nd floor and check in at the [facility] Outpatient Surgery desk as you exit the elevator. Wheelchairs are available for use if using the Main Entrance.    Handicap parking in the land lot, in front of hospital by main entrance, wheelchairs are available at this entrance    INSTRUCTIONS ABOUT EATING OR DRINKING BEFORE SURGERY:  Unless told otherwise by your surgeon, do not eat any solid foods or drink anything after midnight the night prior to your procedure. This also includes no gum, candy, lozenges, ice, or any other oral consumption    ADDITIONAL INSTRUCTIONS:  Please contact your surgeon’s office about any changes in your health, such as bad cold, fever, sore throat, or COVID within the last 4 weeks.    Talk to your surgeon for instructions if  you should stop your aspirin, NSAIDS, blood thinners, or diabetes medicines, multivitamins or over the counter supplements since many surgeons have you adjust or stop these medications prior to procedure. The doctor’s office may also have you contact the prescribing doctor for medication adjustments for your surgery.    If you take certain medications such as a Beta Blocker or Anti-Seizure medication, you may be instructed to take them in the morning of procedure with a sip of water. If this is the case your surgeon's office should let you know, and the PAT nurses will follow up when they speak to you to make sure you are aware.    You are allowed 2 adults over the age of 18 to accompany you on the day of surgery. Only one person may be allowed to go back into the pre-operative area with you at a time.    If you are not being admitted after your surgery, and you are receiving any type of anesthesia (general, sedation, local, MAC, etc) with your procedure, you must have an adult (age 18 or older) immediately available to drive you home after surgery or your procedure will be cancelled. You may be discharged home after surgery with Uber, Lyft, Taxi or any other transportation service as long as the responsible adult (18 or older) is in the vehicle with you at time of discharge. The  of these transportation services is not responsible for your care and cannot be considered a responsible adult. We also highly recommend you have someone stay with you for the entire day and night of your surgery.    All jewelry and piercings must be removed. If you are unable to remove an item or have a dermal piercing, please be sure to tell the nurse when you arrive for surgery.    Nail polish must be removed off one finger of each hand    Make-up or other beauty products (lotion, deodorant, hairspray, perfume, etc.) must be removed or not used for the day of surgery.    Do not wear contacts to hospital, bring your glasses and a  case    Leave valuables at home except photo ID, insurance card and any co-payment that has been requested by hospital    Avoid smoking, consuming alcohol, or any medical or recreational drug use for 24 hours before surgery.    To help prevent infection, please take a shower/bath and wash your hair the night before and/or morning of surgery. You can brush your teeth, just do not swallow any water.    For adults who are unable to consent or make medical decisions for themselves, a legal guardian or Power of  must accompany them to the hospital. If this is not possible, please call [phone] to make additional arrangements. Please bring guardianship or legal Power of  paperwork with you on the day of surgery.    Wear comfortable, loose-fitting clothing.    Do not bring any home medications with you to the hospital.    If you have any questions or concerns, please call Pre-Admission Testing at [phone] or your Physician’s office   Dr. Jaylen Tavarez   [phone]  [facility] General Line: [phone]

## 2024-10-11 ENCOUNTER — APPOINTMENT (OUTPATIENT)
Dept: PRIMARY CARE | Facility: CLINIC | Age: 64
End: 2024-10-11
Payer: COMMERCIAL

## 2024-10-11 VITALS
BODY MASS INDEX: 39.01 KG/M2 | RESPIRATION RATE: 14 BRPM | TEMPERATURE: 98 F | SYSTOLIC BLOOD PRESSURE: 122 MMHG | HEART RATE: 78 BPM | HEIGHT: 72 IN | WEIGHT: 288 LBS | DIASTOLIC BLOOD PRESSURE: 70 MMHG | OXYGEN SATURATION: 97 %

## 2024-10-11 DIAGNOSIS — M16.12 PRIMARY OSTEOARTHRITIS OF LEFT HIP: ICD-10-CM

## 2024-10-11 DIAGNOSIS — Z01.818 PRE-OPERATIVE CLEARANCE: Primary | ICD-10-CM

## 2024-10-11 LAB
ATRIAL RATE: 67 BPM
P AXIS: -2 DEGREES
P OFFSET: 186 MS
P ONSET: 131 MS
PR INTERVAL: 180 MS
Q ONSET: 221 MS
QRS COUNT: 11 BEATS
QRS DURATION: 90 MS
QT INTERVAL: 396 MS
QTC CALCULATION(BAZETT): 418 MS
QTC FREDERICIA: 411 MS
R AXIS: 39 DEGREES
STAPHYLOCOCCUS SPEC CULT: NORMAL
T AXIS: 38 DEGREES
T OFFSET: 419 MS
VENTRICULAR RATE: 67 BPM

## 2024-10-11 ASSESSMENT — ENCOUNTER SYMPTOMS
DYSURIA: 0
TINGLING: 0
NUMBNESS: 0
CHILLS: 0
ABDOMINAL PAIN: 0
FREQUENCY: 0
WHEEZING: 0
HEADACHES: 0
CONSTIPATION: 0
INABILITY TO BEAR WEIGHT: 1
RHINORRHEA: 0
DIARRHEA: 0
SORE THROAT: 0
HIP PAIN: 1
SHORTNESS OF BREATH: 0
VOMITING: 0
LOSS OF SENSATION: 0
MUSCLE WEAKNESS: 0
HEMATURIA: 0
FEVER: 0
COUGH: 0
PALPITATIONS: 0
TREMORS: 0
LOSS OF MOTION: 0
DIZZINESS: 0

## 2024-10-11 NOTE — H&P (VIEW-ONLY)
Subjective   Patient ID: Conrado Parker is a 64 y.o. male who presents for Pre-op Exam (Left hip surgery with Dr Tavarez on 10/25/2024).    This patient was referred to me by Dr. Jaylen Tavarez for pre-op evaluation prior to Left Arthroplasty Total Hip which is scheduled for 10/25/2024 at [facility].  Patient had pre-operative testing at [facility] on 10/10/2024.     Hip Pain   The incident occurred more than 1 week ago. There was no injury mechanism. The pain is present in the left hip. The quality of the pain is described as aching. The pain is at a severity of 6/10. The pain is moderate. The pain has been Fluctuating since onset. Associated symptoms include an inability to bear weight. Pertinent negatives include no loss of motion, loss of sensation, muscle weakness, numbness or tingling. He reports no foreign bodies present. The symptoms are aggravated by movement and weight bearing. He has tried ice, non-weight bearing, rest and NSAIDs for the symptoms. The treatment provided mild relief.        Review of Systems   Constitutional:  Negative for chills and fever.   HENT:  Negative for congestion, ear pain, nosebleeds, rhinorrhea and sore throat.    Respiratory:  Negative for cough, shortness of breath and wheezing.    Cardiovascular:  Negative for chest pain, palpitations and leg swelling.   Gastrointestinal:  Negative for abdominal pain, constipation, diarrhea and vomiting.   Genitourinary:  Negative for dysuria, frequency and hematuria.   Neurological:  Negative for dizziness, tingling, tremors, numbness and headaches.       Objective   /70 (BP Location: Left arm, Patient Position: Sitting, BP Cuff Size: Large adult long)   Pulse 78   Temp 36.7 °C (98 °F)   Resp 14   Ht 1.829 m (6')   Wt 131 kg (288 lb)   SpO2 97%   BMI 39.06 kg/m²     Physical Exam  Constitutional:       General: He is not in acute distress.     Appearance: Normal appearance.   HENT:      Head: Normocephalic and atraumatic.       Mouth/Throat:      Mouth: Mucous membranes are moist.      Pharynx: Oropharynx is clear. No oropharyngeal exudate or posterior oropharyngeal erythema.   Eyes:      General: No scleral icterus.     Extraocular Movements: Extraocular movements intact.      Pupils: Pupils are equal, round, and reactive to light.   Cardiovascular:      Rate and Rhythm: Normal rate and regular rhythm.      Pulses: Normal pulses.      Heart sounds: No murmur heard.     No friction rub. No gallop.   Pulmonary:      Effort: Pulmonary effort is normal.      Breath sounds: No wheezing, rhonchi or rales.   Skin:     General: Skin is warm.      Coloration: Skin is not jaundiced or pale.      Findings: No erythema or rash.   Neurological:      General: No focal deficit present.      Mental Status: He is alert and oriented to person, place, and time.      Cranial Nerves: No cranial nerve deficit.      Sensory: No sensory deficit.      Coordination: Coordination normal.      Gait: Gait normal.       Encounter Date: 10/10/24   ECG 12 lead (Ancillary Performed)   Result Value    Ventricular Rate 67    Atrial Rate 67    SC Interval 180    QRS Duration 90    QT Interval 396    QTC Calculation(Bazett) 418    P Axis -2    R Axis 39    T Axis 38    QRS Count 11    Q Onset 221    P Onset 131    P Offset 186    T Offset 419    QTC Fredericia 411    Narrative    Normal sinus rhythm  Normal ECG  No previous ECGs available  Confirmed by Corbin Hernandez ([id number]) on 10/11/2024 11:54:50 AM         Lab Results   Component Value Date    WBC 10.9 10/10/2024    HGB 17.5 10/10/2024    HCT 53.8 (H) 10/10/2024     10/10/2024    CHOL 213 (H) 02/16/2023    TRIG 114 02/16/2023    HDL 43.8 02/16/2023    ALT 14 10/10/2024    AST 15 10/10/2024     10/10/2024    K 4.5 10/10/2024     10/10/2024    CREATININE 1.16 10/10/2024    BUN 14 10/10/2024    CO2 28 10/10/2024    INR 1.0 10/10/2024    HGBA1C 5.4 10/10/2024     Hospital Outpatient Visit on 10/10/2024    Component Date Value Ref Range Status    Ventricular Rate 10/10/2024 67  BPM Final    Atrial Rate 10/10/2024 67  BPM Final    MS Interval 10/10/2024 180  ms Final    QRS Duration 10/10/2024 90  ms Final    QT Interval 10/10/2024 396  ms Final    QTC Calculation(Bazett) 10/10/2024 418  ms Final    P Axis 10/10/2024 -2  degrees Final    R Axis 10/10/2024 39  degrees Final    T Axis 10/10/2024 38  degrees Final    QRS Count 10/10/2024 11  beats Final    Q Onset 10/10/2024 221  ms Final    P Onset 10/10/2024 131  ms Final    P Offset 10/10/2024 186  ms Final    T Offset 10/10/2024 419  ms Final    QTC Fredericia 10/10/2024 411  ms Final       Assessment/Plan   Problem List Items Addressed This Visit       Primary osteoarthritis of left hip     Follow up with Dr. Tavarez on 10/23/2024 for pre operative appointment.            Pre-operative clearance - Primary     The patient has no medical contraindication to surgery from cardiopulmonary standpoint, we will notify the attending surgeon.  Recommend routine prophylaxis for venous thromboembolism.             Scribe Attestation  By signing my name below, I, Crystal Pack, Scribe   attest that this documentation has been prepared under the direction and in the presence of Win Overton MD .

## 2024-10-11 NOTE — PROGRESS NOTES
Subjective   Patient ID: Conrado Parker is a 64 y.o. male who presents for Pre-op Exam (Left hip surgery with Dr Tavarez on 10/25/2024).    This patient was referred to me by Dr. Jaylen Tavarez for pre-op evaluation prior to Left Arthroplasty Total Hip which is scheduled for 10/25/2024 at [facility].  Patient had pre-operative testing at [facility] on 10/10/2024.     Hip Pain   The incident occurred more than 1 week ago. There was no injury mechanism. The pain is present in the left hip. The quality of the pain is described as aching. The pain is at a severity of 6/10. The pain is moderate. The pain has been Fluctuating since onset. Associated symptoms include an inability to bear weight. Pertinent negatives include no loss of motion, loss of sensation, muscle weakness, numbness or tingling. He reports no foreign bodies present. The symptoms are aggravated by movement and weight bearing. He has tried ice, non-weight bearing, rest and NSAIDs for the symptoms. The treatment provided mild relief.        Review of Systems   Constitutional:  Negative for chills and fever.   HENT:  Negative for congestion, ear pain, nosebleeds, rhinorrhea and sore throat.    Respiratory:  Negative for cough, shortness of breath and wheezing.    Cardiovascular:  Negative for chest pain, palpitations and leg swelling.   Gastrointestinal:  Negative for abdominal pain, constipation, diarrhea and vomiting.   Genitourinary:  Negative for dysuria, frequency and hematuria.   Neurological:  Negative for dizziness, tingling, tremors, numbness and headaches.       Objective   /70 (BP Location: Left arm, Patient Position: Sitting, BP Cuff Size: Large adult long)   Pulse 78   Temp 36.7 °C (98 °F)   Resp 14   Ht 1.829 m (6')   Wt 131 kg (288 lb)   SpO2 97%   BMI 39.06 kg/m²     Physical Exam  Constitutional:       General: He is not in acute distress.     Appearance: Normal appearance.   HENT:      Head: Normocephalic and atraumatic.       Mouth/Throat:      Mouth: Mucous membranes are moist.      Pharynx: Oropharynx is clear. No oropharyngeal exudate or posterior oropharyngeal erythema.   Eyes:      General: No scleral icterus.     Extraocular Movements: Extraocular movements intact.      Pupils: Pupils are equal, round, and reactive to light.   Cardiovascular:      Rate and Rhythm: Normal rate and regular rhythm.      Pulses: Normal pulses.      Heart sounds: No murmur heard.     No friction rub. No gallop.   Pulmonary:      Effort: Pulmonary effort is normal.      Breath sounds: No wheezing, rhonchi or rales.   Skin:     General: Skin is warm.      Coloration: Skin is not jaundiced or pale.      Findings: No erythema or rash.   Neurological:      General: No focal deficit present.      Mental Status: He is alert and oriented to person, place, and time.      Cranial Nerves: No cranial nerve deficit.      Sensory: No sensory deficit.      Coordination: Coordination normal.      Gait: Gait normal.       Encounter Date: 10/10/24   ECG 12 lead (Ancillary Performed)   Result Value    Ventricular Rate 67    Atrial Rate 67    MN Interval 180    QRS Duration 90    QT Interval 396    QTC Calculation(Bazett) 418    P Axis -2    R Axis 39    T Axis 38    QRS Count 11    Q Onset 221    P Onset 131    P Offset 186    T Offset 419    QTC Fredericia 411    Narrative    Normal sinus rhythm  Normal ECG  No previous ECGs available  Confirmed by Corbin Hernandez ([id number]) on 10/11/2024 11:54:50 AM         Lab Results   Component Value Date    WBC 10.9 10/10/2024    HGB 17.5 10/10/2024    HCT 53.8 (H) 10/10/2024     10/10/2024    CHOL 213 (H) 02/16/2023    TRIG 114 02/16/2023    HDL 43.8 02/16/2023    ALT 14 10/10/2024    AST 15 10/10/2024     10/10/2024    K 4.5 10/10/2024     10/10/2024    CREATININE 1.16 10/10/2024    BUN 14 10/10/2024    CO2 28 10/10/2024    INR 1.0 10/10/2024    HGBA1C 5.4 10/10/2024     Hospital Outpatient Visit on 10/10/2024    Component Date Value Ref Range Status    Ventricular Rate 10/10/2024 67  BPM Final    Atrial Rate 10/10/2024 67  BPM Final    KS Interval 10/10/2024 180  ms Final    QRS Duration 10/10/2024 90  ms Final    QT Interval 10/10/2024 396  ms Final    QTC Calculation(Bazett) 10/10/2024 418  ms Final    P Axis 10/10/2024 -2  degrees Final    R Axis 10/10/2024 39  degrees Final    T Axis 10/10/2024 38  degrees Final    QRS Count 10/10/2024 11  beats Final    Q Onset 10/10/2024 221  ms Final    P Onset 10/10/2024 131  ms Final    P Offset 10/10/2024 186  ms Final    T Offset 10/10/2024 419  ms Final    QTC Fredericia 10/10/2024 411  ms Final       Assessment/Plan   Problem List Items Addressed This Visit       Primary osteoarthritis of left hip     Follow up with Dr. Tavarez on 10/23/2024 for pre operative appointment.            Pre-operative clearance - Primary     The patient has no medical contraindication to surgery from cardiopulmonary standpoint, we will notify the attending surgeon.  Recommend routine prophylaxis for venous thromboembolism.             Scribe Attestation  By signing my name below, I, Crystal Pack, Scribe   attest that this documentation has been prepared under the direction and in the presence of Win Overton MD .

## 2024-10-12 DIAGNOSIS — Z01.818 PRE-OPERATIVE CLEARANCE: Primary | ICD-10-CM

## 2024-10-12 RX ORDER — CHLORHEXIDINE GLUCONATE ORAL RINSE 1.2 MG/ML
15 SOLUTION DENTAL DAILY
Qty: 30 ML | Refills: 0 | Status: SHIPPED | OUTPATIENT
Start: 2024-10-12 | End: 2024-10-14

## 2024-10-23 ENCOUNTER — EVALUATION (OUTPATIENT)
Dept: PHYSICAL THERAPY | Facility: CLINIC | Age: 64
End: 2024-10-23
Payer: COMMERCIAL

## 2024-10-23 ENCOUNTER — OFFICE VISIT (OUTPATIENT)
Dept: ORTHOPEDIC SURGERY | Facility: CLINIC | Age: 64
End: 2024-10-23
Payer: COMMERCIAL

## 2024-10-23 ENCOUNTER — PREP FOR PROCEDURE (OUTPATIENT)
Dept: ORTHOPEDIC SURGERY | Facility: CLINIC | Age: 64
End: 2024-10-23

## 2024-10-23 DIAGNOSIS — M16.12 PRIMARY OSTEOARTHRITIS OF LEFT HIP: Primary | ICD-10-CM

## 2024-10-23 DIAGNOSIS — M16.12 UNILATERAL PRIMARY OSTEOARTHRITIS, LEFT HIP: Primary | ICD-10-CM

## 2024-10-23 PROCEDURE — 97161 PT EVAL LOW COMPLEX 20 MIN: CPT | Mod: GP | Performed by: PHYSICAL THERAPIST

## 2024-10-23 PROCEDURE — 99211 OFF/OP EST MAY X REQ PHY/QHP: CPT | Performed by: ORTHOPAEDIC SURGERY

## 2024-10-23 PROCEDURE — 97535 SELF CARE MNGMENT TRAINING: CPT | Mod: GP | Performed by: PHYSICAL THERAPIST

## 2024-10-23 NOTE — PROGRESS NOTES
Subjective    Patient ID: Conrado    Chief Complaint:   Chief Complaint   Patient presents with    Left Hip - Pre-op Exam     Lt THR 10/25 @ [facility]     This patient has pain in the hip that is increased with activity and weightbearing, the patient walks with an antalgic gait at this time.  The pain is interfering with activities of daily living.  The patient has limited range of motion of the hip and pain with passive range of motion.  This x-ray demonstrates joint space narrowing, subchondral sclerosis, and osteophyte formation.  The patient has failed to respond to conservative treatment with medication therapy and supportive devices for period of at least 3 months.     This is the preop visit to discuss the risks and benefits of the total hip replacement surgery.  These risks were fully explained to the patient.  With this, and as any surgery, infection is a risk.  The risk for infection is usually between 1 and 2%.  This risk is even higher in diabetics, persons with rheumatoid arthritis, patients with previous surgery, patients on oral steroid medication, and obesity.  All of these issues were properly discussed.  We always assure all sterile techniques will be followed during the procedure.  Patient is also need to be on antibiotics for the next 2 years for any minor surgical procedure, even dental work.  For high risk patients this will be for lifetime.  Severe infections may require removal of the prosthesis.     It was also explained to the patient that there will be some blood loss during the procedure.  Transfusions although rare will only be given when absolutely medically necessary.     Pulmonary embolism and blood clots were also discussed with the patient.  We discussed that these can be fatal complications of the procedure.  Is explained to the patient that the use of thromboembolic stockings, foot pumps, incentive spirometer, early mobility, and the use of blood thinners for a period of time is  the standard of care.     Dislocations are discussed with the patient.  It is explained that the highest risk for dislocating the hip happens during the first 3 months after surgery.  These risks tend to decrease with time.  This risk is higher and revisions.  It is explained to the patient that hip precautions are very important and that these will be carried out throughout the lifetime with her prosthesis.  Intraoperatively, we will adjust the length of the leg to tighten the joint to help prevent dislocation.  This leg lengthening to stabilize the joint is usually no more than 1/4 inch but may be more or less to improve stability.     Loosening and wear of the prosthesis is also discussed.  The prosthesis normally lasts between 12 and 15 years on the average.  Revisions may be more complicated.     Fractures, though rare, they also occur intraoperatively.  These fractures may occur in the pelvis or the femur.  There may be nerve or arterial injuries as well and these are discussed in detail.  Lastly the benefits of spinal anesthesia were explained to the patient.     All of the patient's questions and concerns were answered in detail.     This note was prepared using voice recognition software.  The details of this note are correct and have been reviewed, and corrected to the best of my ability.  Some grammatical areas may persist related to the Dragon software     Luis Diaz PA-C     [phone]

## 2024-10-23 NOTE — PROGRESS NOTES
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  She is here for a physical  Denies any problem  The following portions of the patient's history were reviewed and updated as appropriate: past family history, past social history and past surgical history  Review of Systems   Constitutional: Negative for appetite change, chills, fatigue, fever and unexpected weight change  HENT: Negative  Eyes: Negative for visual disturbance  Respiratory: Negative for cough, chest tightness and shortness of breath  Cardiovascular: Negative for chest pain, palpitations and leg swelling  Gastrointestinal: Negative for abdominal pain, blood in stool, constipation, diarrhea, nausea and vomiting  Endocrine: Negative for cold intolerance, heat intolerance, polydipsia, polyphagia and polyuria  Genitourinary: Negative for dysuria, frequency, urgency, vaginal bleeding and vaginal discharge  Musculoskeletal: Negative for arthralgias, back pain and neck pain  Skin: Negative for rash  Neurological: Negative for dizziness, numbness and headaches  Psychiatric/Behavioral: Negative for agitation and behavioral problems  Data to review:       Objective: There were no vitals filed for this visit  Physical Exam   Constitutional: She is oriented to person, place, and time  She appears well-developed and well-nourished  Physical Therapy Evaluation and Treatment      Patient Name: Conrado Parker  MRN: [id number]  Today's Date: 10/24/2024  Treatment Date: 10/23/2024  Time Calculation  Start Time: 0822  Stop Time: 0902  Time Calculation (min): 40 min      PT Evaluation Time Entry  PT Evaluation (Low) Time Entry: 20  PT Therapeutic Procedures Time Entry  Self-Care/Home Mgmt Training: 15                   INSURANCE:  Visit number: 1/1  [employer] MED[employer]ARE BMN PT OT COPAY 0 DED 0  COVERAGE 80 OOP 7350([id number]) NO AUTH REQ  REF# [id number]     Surgery: Pre-op L BRIDGET posterior approach  Referring Provider: Dr. Jaylen Tavarez    ASSESSMENT:  Stable and/or uncomplicated characteristics     PT Pre-Op Assessment -  Conrado Parker is a 64 y.o. male presenting to the clinic for a pre-op appointment for a L BRIDGET posterior approach on 10/25/24 by Dr. Jaylen Tavarez. Pt demonstrates decreased ROM and strength of the left L hip causing pain and dysfunction with walking, standing, shopping, squatting, STSs, and stairs.     Patient demonstrates independent knowledge and understanding of: anatomy, surgical procedure, post-operative exercise programs, signs and symptoms of post-operative infection/blood clots and post-operative management of pain. Patient demonstrates good safety awareness and modified independence with ambulation: 3-point gait pattern with a walker WBAT on left LE, on level surfaces and on stairs.    Handouts Given: surgical overview booklet, post-operative exercises, and gait training instructions.    Plan of Care: Patient will be placed on hold for therapy until after completion of surgical procedure. Upon return to therapy, patient's status will be re-assessed and goal updated.  Patient plans to attend their post op follow up at the [facility].    PLAN:  Treatments/Interventions: gait training, edema control/modalities, education/instruction, home program, self care/home management, therapeutic  HENT:   Head: Normocephalic and atraumatic  Mouth/Throat: No oropharyngeal exudate  Eyes: Pupils are equal, round, and reactive to light  EOM are normal    Neck: Normal range of motion  No JVD present  No thyromegaly present  Cardiovascular: Normal rate, regular rhythm and normal heart sounds  No murmur heard  Pulmonary/Chest: Effort normal and breath sounds normal  No respiratory distress  Abdominal: Soft  Bowel sounds are normal  She exhibits no distension  Musculoskeletal: Normal range of motion  She exhibits no edema  Lymphadenopathy:     She has no cervical adenopathy  Neurological: She is alert and oriented to person, place, and time  Skin: Skin is warm and dry  Capillary refill takes less than 2 seconds  Psychiatric: She has a normal mood and affect   Her behavior is normal  activities, therapeutic exercises.  PT Plan: Skilled PT  PT Frequency: 1 time per week  Duration: 1 visit  Onset Date: 10/23/22  Rehab Potential: Good  Plan of Care Agreement: Patient    CURRENT PROBLEM:   1. Unilateral primary osteoarthritis, left hip            Subjective      General -    Pt is scheduled for a L BRIDGET posterior approach on 10/25/24 by Dr. Jaylen Tavarez. Pt has been having pain in his L hip for the past 2 years. Pt reports that the cortisone injections are no longer working well.     Mechanism of Injury -   Insidious Onset    History of Current Episode - 2 years    Pain -    Pain Location: L hip  Pain Best: 3/10  Pain Today: 6-7/10  Pain Worst: 9/10   Pain Type: Constant, Achy/Dull, Sharp, Stiffness/Tightness, Numbness, Tingling    Pain Exacerbating Factors: walking, standing, shopping, squatting, STSs, and stairs.   Pain Relieving Factors: sitting, crossing legs,Rest  Difficulty Sleeping: No  Night Sweats, Loss of Appetite, Unexpected Weight-loss: No    Work -   Work Status: Full Time     Will be off after surgery    Home Living -    Stairs into Home: 1 steps  Stairs in Home: 1 flight with a railing to second floor and do not have to use; first floor bed and bathy  Pt lives with his wife.    PRECAUTIONS -    None at this time    Prior Level of Function -    I with ADLs/IADLs     Objective     Hip MMT (/5) -   R hip Flexion: 4+  R hip Adduction: 4+  R hip Abduction: 4+  L hip Flexion: 4- with pain  L hip Adduction: 4  L hip Abduction: 4-     Treatment -   Evaluation -   Low (60871)  Self Care Management Training (39470) -   Reviewed anatomy, surgical procedure, signs and symptoms of post-operative infection/blood clots, and post-operative management of pain.  Reviewed ambulation: 3-point gait pattern with a walker WBAT on left LE, on level surfaces and on stairs.  Reviewed post-operative exercises    OP Patient Education -   Handouts Given: surgical overview booklet, post-operative  exercises, and gait training instructions.

## 2024-10-24 ENCOUNTER — ANESTHESIA EVENT (OUTPATIENT)
Dept: OPERATING ROOM | Facility: HOSPITAL | Age: 64
End: 2024-10-24
Payer: COMMERCIAL

## 2024-10-24 PROCEDURE — RXMED WILLOW AMBULATORY MEDICATION CHARGE

## 2024-10-24 SDOH — HEALTH STABILITY: MENTAL HEALTH: CURRENT SMOKER: 1

## 2024-10-24 NOTE — ANESTHESIA PREPROCEDURE EVALUATION
Patient: Conrado Parker    Procedure Information       Date/Time: 10/25/24 4731    Procedure: Arthroplasty Total Hip Posterior Approach (Left: Hip) - EDA HYDE THIGH HIGH    Location: [facility] / [facility]    Surgeons: Jaylen Tavarez MD            Relevant Problems   Cardiac   (+) Mixed hyperlipidemia      Endocrine   (+) Class 2 severe obesity due to excess calories with serious comorbidity and body mass index (BMI) of 39.0 to 39.9 in adult      Musculoskeletal   (+) Primary osteoarthritis of left hip   (+) Unilateral primary osteoarthritis, left hip       Clinical information reviewed:      Problems              NPO Detail:  No data recorded     Physical Exam    Airway  Mallampati: II  TM distance: >3 FB  Neck ROM: full     Cardiovascular    Dental - normal exam     Pulmonary    Abdominal            Anesthesia Plan    History of general anesthesia?: yes  History of complications of general anesthesia?: no    ASA 3     spinal     The patient is a current smoker.  Patient was previously instructed to abstain from smoking on day of procedure.  Patient smoked on day of procedure.    intravenous induction   Anesthetic plan and risks discussed with patient.    Plan discussed with CRNA.

## 2024-10-25 ENCOUNTER — HOME HEALTH ADMISSION (OUTPATIENT)
Dept: HOME HEALTH SERVICES | Facility: HOME HEALTH | Age: 64
End: 2024-10-25
Payer: COMMERCIAL

## 2024-10-25 ENCOUNTER — ANESTHESIA (OUTPATIENT)
Dept: OPERATING ROOM | Facility: HOSPITAL | Age: 64
End: 2024-10-25
Payer: COMMERCIAL

## 2024-10-25 ENCOUNTER — HOSPITAL ENCOUNTER (OUTPATIENT)
Facility: HOSPITAL | Age: 64
Discharge: HOME | End: 2024-10-26
Attending: ORTHOPAEDIC SURGERY | Admitting: ORTHOPAEDIC SURGERY
Payer: COMMERCIAL

## 2024-10-25 ENCOUNTER — APPOINTMENT (OUTPATIENT)
Dept: RADIOLOGY | Facility: HOSPITAL | Age: 64
End: 2024-10-25
Payer: COMMERCIAL

## 2024-10-25 DIAGNOSIS — M16.12 UNILATERAL PRIMARY OSTEOARTHRITIS, LEFT HIP: Primary | ICD-10-CM

## 2024-10-25 DIAGNOSIS — Z96.642 STATUS POST LEFT HIP REPLACEMENT: ICD-10-CM

## 2024-10-25 PROCEDURE — 97110 THERAPEUTIC EXERCISES: CPT | Mod: GP | Performed by: PHYSICAL THERAPIST

## 2024-10-25 PROCEDURE — 2500000002 HC RX 250 W HCPCS SELF ADMINISTERED DRUGS (ALT 637 FOR MEDICARE OP, ALT 636 FOR OP/ED): Performed by: ORTHOPAEDIC SURGERY

## 2024-10-25 PROCEDURE — 7100000011 HC EXTENDED STAY RECOVERY HOURLY - NURSING UNIT

## 2024-10-25 PROCEDURE — 73501 X-RAY EXAM HIP UNI 1 VIEW: CPT | Mod: LEFT SIDE | Performed by: RADIOLOGY

## 2024-10-25 PROCEDURE — 2720000007 HC OR 272 NO HCPCS: Performed by: ORTHOPAEDIC SURGERY

## 2024-10-25 PROCEDURE — 73501 X-RAY EXAM HIP UNI 1 VIEW: CPT | Mod: LT

## 2024-10-25 PROCEDURE — 2500000004 HC RX 250 GENERAL PHARMACY W/ HCPCS (ALT 636 FOR OP/ED): Performed by: ORTHOPAEDIC SURGERY

## 2024-10-25 PROCEDURE — 2780000003 HC OR 278 NO HCPCS: Performed by: ORTHOPAEDIC SURGERY

## 2024-10-25 PROCEDURE — 2500000004 HC RX 250 GENERAL PHARMACY W/ HCPCS (ALT 636 FOR OP/ED): Performed by: PHARMACIST

## 2024-10-25 PROCEDURE — A6213 FOAM DRG >16<=48 SQ IN W/BDR: HCPCS | Performed by: ORTHOPAEDIC SURGERY

## 2024-10-25 PROCEDURE — RXMED WILLOW AMBULATORY MEDICATION CHARGE

## 2024-10-25 PROCEDURE — 3600000017 HC OR TIME - EACH INCREMENTAL 1 MINUTE - PROCEDURE LEVEL SIX: Performed by: ORTHOPAEDIC SURGERY

## 2024-10-25 PROCEDURE — 2500000005 HC RX 250 GENERAL PHARMACY W/O HCPCS: Performed by: ORTHOPAEDIC SURGERY

## 2024-10-25 PROCEDURE — 3700000002 HC GENERAL ANESTHESIA TIME - EACH INCREMENTAL 1 MINUTE: Performed by: ORTHOPAEDIC SURGERY

## 2024-10-25 PROCEDURE — 2500000001 HC RX 250 WO HCPCS SELF ADMINISTERED DRUGS (ALT 637 FOR MEDICARE OP): Performed by: ORTHOPAEDIC SURGERY

## 2024-10-25 PROCEDURE — 7100000001 HC RECOVERY ROOM TIME - INITIAL BASE CHARGE: Performed by: ORTHOPAEDIC SURGERY

## 2024-10-25 PROCEDURE — 2500000004 HC RX 250 GENERAL PHARMACY W/ HCPCS (ALT 636 FOR OP/ED): Performed by: NURSE ANESTHETIST, CERTIFIED REGISTERED

## 2024-10-25 PROCEDURE — 7100000002 HC RECOVERY ROOM TIME - EACH INCREMENTAL 1 MINUTE: Performed by: ORTHOPAEDIC SURGERY

## 2024-10-25 PROCEDURE — 3700000001 HC GENERAL ANESTHESIA TIME - INITIAL BASE CHARGE: Performed by: ORTHOPAEDIC SURGERY

## 2024-10-25 PROCEDURE — 3600000018 HC OR TIME - INITIAL BASE CHARGE - PROCEDURE LEVEL SIX: Performed by: ORTHOPAEDIC SURGERY

## 2024-10-25 PROCEDURE — 97161 PT EVAL LOW COMPLEX 20 MIN: CPT | Mod: GP | Performed by: PHYSICAL THERAPIST

## 2024-10-25 PROCEDURE — 27130 TOTAL HIP ARTHROPLASTY: CPT | Performed by: ORTHOPAEDIC SURGERY

## 2024-10-25 PROCEDURE — C1776 JOINT DEVICE (IMPLANTABLE): HCPCS | Performed by: ORTHOPAEDIC SURGERY

## 2024-10-25 PROCEDURE — 27130 TOTAL HIP ARTHROPLASTY: CPT | Performed by: PHYSICIAN ASSISTANT

## 2024-10-25 DEVICE — CERAMIC V40 FEMORAL HEAD
Type: IMPLANTABLE DEVICE | Site: HIP | Status: FUNCTIONAL
Brand: BIOLOX

## 2024-10-25 DEVICE — TRIDENT X3 10 DEGREE POLYETHYLENE INSERT
Type: IMPLANTABLE DEVICE | Site: HIP | Status: FUNCTIONAL
Brand: TRIDENT X3 INSERT

## 2024-10-25 DEVICE — 127 DEGREE NECK ANGLE HIP STEM
Type: IMPLANTABLE DEVICE | Site: HIP | Status: FUNCTIONAL
Brand: ACCOLADE

## 2024-10-25 DEVICE — TRIDENT II CLUSTERHOLE HA ACETABULAR SHELL 56F
Type: IMPLANTABLE DEVICE | Site: HIP | Status: FUNCTIONAL
Brand: TRIDENT II

## 2024-10-25 RX ORDER — FENTANYL CITRATE 50 UG/ML
50 INJECTION, SOLUTION INTRAMUSCULAR; INTRAVENOUS EVERY 5 MIN PRN
Status: DISCONTINUED | OUTPATIENT
Start: 2024-10-25 | End: 2024-10-25 | Stop reason: HOSPADM

## 2024-10-25 RX ORDER — PROCHLORPERAZINE MALEATE 5 MG
10 TABLET ORAL EVERY 6 HOURS PRN
Status: DISCONTINUED | OUTPATIENT
Start: 2024-10-25 | End: 2024-10-26 | Stop reason: HOSPADM

## 2024-10-25 RX ORDER — MORPHINE SULFATE 2 MG/ML
2 INJECTION, SOLUTION INTRAMUSCULAR; INTRAVENOUS EVERY 2 HOUR PRN
Status: DISCONTINUED | OUTPATIENT
Start: 2024-10-25 | End: 2024-10-26 | Stop reason: HOSPADM

## 2024-10-25 RX ORDER — PROPOFOL 10 MG/ML
INJECTION, EMULSION INTRAVENOUS AS NEEDED
Status: DISCONTINUED | OUTPATIENT
Start: 2024-10-25 | End: 2024-10-25

## 2024-10-25 RX ORDER — CEFAZOLIN SODIUM IN 0.9 % NACL 3 G/100 ML
3 INTRAVENOUS SOLUTION, PIGGYBACK (ML) INTRAVENOUS ONCE
Status: COMPLETED | OUTPATIENT
Start: 2024-10-25 | End: 2024-10-25

## 2024-10-25 RX ORDER — OXYCODONE HYDROCHLORIDE 5 MG/1
5 TABLET ORAL EVERY 6 HOURS PRN
Qty: 28 TABLET | Refills: 0 | Status: SHIPPED | OUTPATIENT
Start: 2024-10-25 | End: 2024-11-02

## 2024-10-25 RX ORDER — ASPIRIN 81 MG/1
81 TABLET ORAL 2 TIMES DAILY
Qty: 60 TABLET | Refills: 0 | Status: SHIPPED | OUTPATIENT
Start: 2024-10-25 | End: 2024-11-25

## 2024-10-25 RX ORDER — MIDAZOLAM HYDROCHLORIDE 1 MG/ML
INJECTION, SOLUTION INTRAMUSCULAR; INTRAVENOUS AS NEEDED
Status: DISCONTINUED | OUTPATIENT
Start: 2024-10-25 | End: 2024-10-25

## 2024-10-25 RX ORDER — ACETAMINOPHEN 325 MG/1
650 TABLET ORAL EVERY 6 HOURS SCHEDULED
Qty: 56 TABLET | Refills: 0 | Status: SHIPPED | OUTPATIENT
Start: 2024-10-25 | End: 2024-11-02

## 2024-10-25 RX ORDER — CEFAZOLIN SODIUM 2 G/50ML
2 SOLUTION INTRAVENOUS ONCE
Status: DISCONTINUED | OUTPATIENT
Start: 2024-10-25 | End: 2024-10-25

## 2024-10-25 RX ORDER — ONDANSETRON 4 MG/1
4 TABLET, ORALLY DISINTEGRATING ORAL EVERY 8 HOURS PRN
Status: DISCONTINUED | OUTPATIENT
Start: 2024-10-25 | End: 2024-10-26 | Stop reason: HOSPADM

## 2024-10-25 RX ORDER — MEPERIDINE HYDROCHLORIDE 25 MG/ML
12.5 INJECTION INTRAMUSCULAR; INTRAVENOUS; SUBCUTANEOUS EVERY 10 MIN PRN
Status: DISCONTINUED | OUTPATIENT
Start: 2024-10-25 | End: 2024-10-25 | Stop reason: HOSPADM

## 2024-10-25 RX ORDER — CEFAZOLIN SODIUM 2 G/100ML
2 INJECTION, SOLUTION INTRAVENOUS EVERY 8 HOURS
Status: COMPLETED | OUTPATIENT
Start: 2024-10-25 | End: 2024-10-26

## 2024-10-25 RX ORDER — NALOXONE HYDROCHLORIDE 1 MG/ML
0.2 INJECTION INTRAMUSCULAR; INTRAVENOUS; SUBCUTANEOUS EVERY 5 MIN PRN
Status: DISCONTINUED | OUTPATIENT
Start: 2024-10-25 | End: 2024-10-26 | Stop reason: HOSPADM

## 2024-10-25 RX ORDER — ROPIVACAINE/EPI/CLONIDINE/KET 2.46-0.005
SYRINGE (ML) INJECTION AS NEEDED
Status: DISCONTINUED | OUTPATIENT
Start: 2024-10-25 | End: 2024-10-25 | Stop reason: HOSPADM

## 2024-10-25 RX ORDER — CYCLOBENZAPRINE HCL 10 MG
10 TABLET ORAL 3 TIMES DAILY PRN
Status: DISCONTINUED | OUTPATIENT
Start: 2024-10-25 | End: 2024-10-26 | Stop reason: HOSPADM

## 2024-10-25 RX ORDER — ONDANSETRON HYDROCHLORIDE 2 MG/ML
INJECTION, SOLUTION INTRAVENOUS AS NEEDED
Status: DISCONTINUED | OUTPATIENT
Start: 2024-10-25 | End: 2024-10-25

## 2024-10-25 RX ORDER — PROCHLORPERAZINE 25 MG/1
25 SUPPOSITORY RECTAL EVERY 12 HOURS PRN
Status: DISCONTINUED | OUTPATIENT
Start: 2024-10-25 | End: 2024-10-26 | Stop reason: HOSPADM

## 2024-10-25 RX ORDER — BUPIVACAINE HYDROCHLORIDE 7.5 MG/ML
INJECTION, SOLUTION INTRASPINAL AS NEEDED
Status: DISCONTINUED | OUTPATIENT
Start: 2024-10-25 | End: 2024-10-25

## 2024-10-25 RX ORDER — DIPHENHYDRAMINE HCL 25 MG
25 TABLET ORAL EVERY 6 HOURS PRN
Status: DISCONTINUED | OUTPATIENT
Start: 2024-10-25 | End: 2024-10-26 | Stop reason: HOSPADM

## 2024-10-25 RX ORDER — GABAPENTIN 300 MG/1
600 CAPSULE ORAL ONCE
Status: COMPLETED | OUTPATIENT
Start: 2024-10-25 | End: 2024-10-25

## 2024-10-25 RX ORDER — ASPIRIN 81 MG/1
81 TABLET ORAL 2 TIMES DAILY
Status: DISCONTINUED | OUTPATIENT
Start: 2024-10-25 | End: 2024-10-26 | Stop reason: HOSPADM

## 2024-10-25 RX ORDER — CELECOXIB 200 MG/1
200 CAPSULE ORAL ONCE
Status: COMPLETED | OUTPATIENT
Start: 2024-10-25 | End: 2024-10-25

## 2024-10-25 RX ORDER — SODIUM CHLORIDE, SODIUM LACTATE, POTASSIUM CHLORIDE, CALCIUM CHLORIDE 600; 310; 30; 20 MG/100ML; MG/100ML; MG/100ML; MG/100ML
50 INJECTION, SOLUTION INTRAVENOUS CONTINUOUS
Status: DISCONTINUED | OUTPATIENT
Start: 2024-10-25 | End: 2024-10-26 | Stop reason: HOSPADM

## 2024-10-25 RX ORDER — BISACODYL 5 MG
10 TABLET, DELAYED RELEASE (ENTERIC COATED) ORAL DAILY PRN
Status: DISCONTINUED | OUTPATIENT
Start: 2024-10-25 | End: 2024-10-26 | Stop reason: HOSPADM

## 2024-10-25 RX ORDER — SODIUM CHLORIDE, SODIUM LACTATE, POTASSIUM CHLORIDE, CALCIUM CHLORIDE 600; 310; 30; 20 MG/100ML; MG/100ML; MG/100ML; MG/100ML
100 INJECTION, SOLUTION INTRAVENOUS CONTINUOUS
Status: ACTIVE | OUTPATIENT
Start: 2024-10-25 | End: 2024-10-26

## 2024-10-25 RX ORDER — TRANEXAMIC ACID 650 MG/1
1950 TABLET ORAL ONCE
Status: COMPLETED | OUTPATIENT
Start: 2024-10-25 | End: 2024-10-25

## 2024-10-25 RX ORDER — ACETAMINOPHEN 325 MG/1
650 TABLET ORAL EVERY 6 HOURS SCHEDULED
Status: DISCONTINUED | OUTPATIENT
Start: 2024-10-25 | End: 2024-10-26 | Stop reason: HOSPADM

## 2024-10-25 RX ORDER — OXYCODONE HYDROCHLORIDE 5 MG/1
5 TABLET ORAL EVERY 4 HOURS PRN
Status: DISCONTINUED | OUTPATIENT
Start: 2024-10-25 | End: 2024-10-26 | Stop reason: HOSPADM

## 2024-10-25 RX ORDER — WATER 1 ML/ML
IRRIGANT IRRIGATION AS NEEDED
Status: DISCONTINUED | OUTPATIENT
Start: 2024-10-25 | End: 2024-10-25 | Stop reason: HOSPADM

## 2024-10-25 RX ORDER — KETOROLAC TROMETHAMINE 30 MG/ML
15 INJECTION, SOLUTION INTRAMUSCULAR; INTRAVENOUS EVERY 6 HOURS
Status: COMPLETED | OUTPATIENT
Start: 2024-10-25 | End: 2024-10-26

## 2024-10-25 RX ORDER — PROCHLORPERAZINE EDISYLATE 5 MG/ML
10 INJECTION INTRAMUSCULAR; INTRAVENOUS EVERY 6 HOURS PRN
Status: DISCONTINUED | OUTPATIENT
Start: 2024-10-25 | End: 2024-10-26 | Stop reason: HOSPADM

## 2024-10-25 RX ORDER — CYCLOBENZAPRINE HCL 10 MG
10 TABLET ORAL 3 TIMES DAILY PRN
Qty: 21 TABLET | Refills: 0 | Status: SHIPPED | OUTPATIENT
Start: 2024-10-25 | End: 2024-11-02

## 2024-10-25 RX ORDER — GLYCOPYRROLATE 0.2 MG/ML
INJECTION INTRAMUSCULAR; INTRAVENOUS AS NEEDED
Status: DISCONTINUED | OUTPATIENT
Start: 2024-10-25 | End: 2024-10-25

## 2024-10-25 RX ORDER — SODIUM CHLORIDE, SODIUM LACTATE, POTASSIUM CHLORIDE, CALCIUM CHLORIDE 600; 310; 30; 20 MG/100ML; MG/100ML; MG/100ML; MG/100ML
100 INJECTION, SOLUTION INTRAVENOUS CONTINUOUS
Status: DISCONTINUED | OUTPATIENT
Start: 2024-10-25 | End: 2024-10-25 | Stop reason: HOSPADM

## 2024-10-25 RX ORDER — ONDANSETRON HYDROCHLORIDE 2 MG/ML
4 INJECTION, SOLUTION INTRAVENOUS EVERY 8 HOURS PRN
Status: DISCONTINUED | OUTPATIENT
Start: 2024-10-25 | End: 2024-10-26 | Stop reason: HOSPADM

## 2024-10-25 RX ORDER — TRANEXAMIC ACID 650 MG/1
1950 TABLET ORAL ONCE
Status: COMPLETED | OUTPATIENT
Start: 2024-10-26 | End: 2024-10-26

## 2024-10-25 RX ORDER — OXYCODONE HYDROCHLORIDE 5 MG/1
5 TABLET ORAL EVERY 4 HOURS PRN
Status: DISCONTINUED | OUTPATIENT
Start: 2024-10-25 | End: 2024-10-25 | Stop reason: SDUPTHER

## 2024-10-25 RX ORDER — ONDANSETRON HYDROCHLORIDE 2 MG/ML
4 INJECTION, SOLUTION INTRAVENOUS ONCE AS NEEDED
Status: DISCONTINUED | OUTPATIENT
Start: 2024-10-25 | End: 2024-10-25 | Stop reason: HOSPADM

## 2024-10-25 RX ORDER — ACETAMINOPHEN 325 MG/1
975 TABLET ORAL ONCE
Status: COMPLETED | OUTPATIENT
Start: 2024-10-25 | End: 2024-10-25

## 2024-10-25 RX ORDER — OXYCODONE HYDROCHLORIDE 5 MG/1
10 TABLET ORAL EVERY 6 HOURS PRN
Status: DISCONTINUED | OUTPATIENT
Start: 2024-10-25 | End: 2024-10-26 | Stop reason: HOSPADM

## 2024-10-25 RX ORDER — DOCUSATE SODIUM 100 MG/1
100 CAPSULE, LIQUID FILLED ORAL 2 TIMES DAILY
Qty: 20 CAPSULE | Refills: 0 | Status: SHIPPED | OUTPATIENT
Start: 2024-10-25 | End: 2024-11-05

## 2024-10-25 RX ORDER — DOCUSATE SODIUM 100 MG/1
100 CAPSULE, LIQUID FILLED ORAL 2 TIMES DAILY
Status: DISCONTINUED | OUTPATIENT
Start: 2024-10-25 | End: 2024-10-26 | Stop reason: HOSPADM

## 2024-10-25 SDOH — SOCIAL STABILITY: SOCIAL INSECURITY
WITHIN THE LAST YEAR, HAVE YOU BEEN RAPED OR FORCED TO HAVE ANY KIND OF SEXUAL ACTIVITY BY YOUR PARTNER OR EX-PARTNER?: PATIENT DECLINED

## 2024-10-25 SDOH — SOCIAL STABILITY: SOCIAL INSECURITY: HAS ANYONE EVER THREATENED TO HURT YOUR FAMILY OR YOUR PETS?: NO

## 2024-10-25 SDOH — SOCIAL STABILITY: SOCIAL INSECURITY: DOES ANYONE TRY TO KEEP YOU FROM HAVING/CONTACTING OTHER FRIENDS OR DOING THINGS OUTSIDE YOUR HOME?: NO

## 2024-10-25 SDOH — SOCIAL STABILITY: SOCIAL INSECURITY: WERE YOU ABLE TO COMPLETE ALL THE BEHAVIORAL HEALTH SCREENINGS?: YES

## 2024-10-25 SDOH — ECONOMIC STABILITY: HOUSING INSECURITY: IN THE PAST 12 MONTHS, HOW MANY TIMES HAVE YOU MOVED WHERE YOU WERE LIVING?: 1

## 2024-10-25 SDOH — SOCIAL STABILITY: SOCIAL INSECURITY: WITHIN THE LAST YEAR, HAVE YOU BEEN AFRAID OF YOUR PARTNER OR EX-PARTNER?: PATIENT DECLINED

## 2024-10-25 SDOH — SOCIAL STABILITY: SOCIAL INSECURITY: DO YOU FEEL UNSAFE GOING BACK TO THE PLACE WHERE YOU ARE LIVING?: NO

## 2024-10-25 SDOH — ECONOMIC STABILITY: FOOD INSECURITY: WITHIN THE PAST 12 MONTHS, THE FOOD YOU BOUGHT JUST DIDN'T LAST AND YOU DIDN'T HAVE MONEY TO GET MORE.: PATIENT DECLINED

## 2024-10-25 SDOH — SOCIAL STABILITY: SOCIAL INSECURITY: DO YOU FEEL ANYONE HAS EXPLOITED OR TAKEN ADVANTAGE OF YOU FINANCIALLY OR OF YOUR PERSONAL PROPERTY?: NO

## 2024-10-25 SDOH — ECONOMIC STABILITY: HOUSING INSECURITY: IN THE LAST 12 MONTHS, WAS THERE A TIME WHEN YOU WERE NOT ABLE TO PAY THE MORTGAGE OR RENT ON TIME?: PATIENT DECLINED

## 2024-10-25 SDOH — SOCIAL STABILITY: SOCIAL INSECURITY: HAVE YOU HAD ANY THOUGHTS OF HARMING ANYONE ELSE?: NO

## 2024-10-25 SDOH — ECONOMIC STABILITY: FOOD INSECURITY
WITHIN THE PAST 12 MONTHS, YOU WORRIED THAT YOUR FOOD WOULD RUN OUT BEFORE YOU GOT THE MONEY TO BUY MORE.: PATIENT DECLINED

## 2024-10-25 SDOH — ECONOMIC STABILITY: TRANSPORTATION INSECURITY
IN THE PAST 12 MONTHS, HAS LACK OF TRANSPORTATION KEPT YOU FROM MEDICAL APPOINTMENTS OR FROM GETTING MEDICATIONS?: PATIENT DECLINED

## 2024-10-25 SDOH — SOCIAL STABILITY: SOCIAL INSECURITY: ARE YOU OR HAVE YOU BEEN THREATENED OR ABUSED PHYSICALLY, EMOTIONALLY, OR SEXUALLY BY ANYONE?: NO

## 2024-10-25 SDOH — ECONOMIC STABILITY: INCOME INSECURITY
IN THE PAST 12 MONTHS HAS THE ELECTRIC, GAS, OIL, OR WATER COMPANY THREATENED TO SHUT OFF SERVICES IN YOUR HOME?: PATIENT DECLINED

## 2024-10-25 SDOH — SOCIAL STABILITY: SOCIAL INSECURITY
WITHIN THE LAST YEAR, HAVE YOU BEEN KICKED, HIT, SLAPPED, OR OTHERWISE PHYSICALLY HURT BY YOUR PARTNER OR EX-PARTNER?: PATIENT DECLINED

## 2024-10-25 SDOH — SOCIAL STABILITY: SOCIAL INSECURITY: ARE THERE ANY APPARENT SIGNS OF INJURIES/BEHAVIORS THAT COULD BE RELATED TO ABUSE/NEGLECT?: NO

## 2024-10-25 SDOH — SOCIAL STABILITY: SOCIAL INSECURITY: HAVE YOU HAD THOUGHTS OF HARMING ANYONE ELSE?: NO

## 2024-10-25 SDOH — SOCIAL STABILITY: SOCIAL INSECURITY
WITHIN THE LAST YEAR, HAVE YOU BEEN HUMILIATED OR EMOTIONALLY ABUSED IN OTHER WAYS BY YOUR PARTNER OR EX-PARTNER?: PATIENT DECLINED

## 2024-10-25 SDOH — SOCIAL STABILITY: SOCIAL INSECURITY: ABUSE: ADULT

## 2024-10-25 SDOH — ECONOMIC STABILITY: FOOD INSECURITY: HOW HARD IS IT FOR YOU TO PAY FOR THE VERY BASICS LIKE FOOD, HOUSING, MEDICAL CARE, AND HEATING?: PATIENT DECLINED

## 2024-10-25 SDOH — ECONOMIC STABILITY: HOUSING INSECURITY: AT ANY TIME IN THE PAST 12 MONTHS, WERE YOU HOMELESS OR LIVING IN A SHELTER (INCLUDING NOW)?: PATIENT DECLINED

## 2024-10-25 ASSESSMENT — COGNITIVE AND FUNCTIONAL STATUS - GENERAL
WALKING IN HOSPITAL ROOM: A LOT
TOILETING: A LITTLE
TURNING FROM BACK TO SIDE WHILE IN FLAT BAD: A LITTLE
STANDING UP FROM CHAIR USING ARMS: TOTAL
HELP NEEDED FOR BATHING: A LITTLE
MOVING TO AND FROM BED TO CHAIR: A LOT
HELP NEEDED FOR BATHING: A LITTLE
MOBILITY SCORE: 12
DRESSING REGULAR UPPER BODY CLOTHING: A LITTLE
MOVING FROM LYING ON BACK TO SITTING ON SIDE OF FLAT BED WITH BEDRAILS: A LITTLE
MOBILITY SCORE: 15
DRESSING REGULAR LOWER BODY CLOTHING: A LITTLE
PATIENT BASELINE BEDBOUND: NO
MOVING TO AND FROM BED TO CHAIR: A LOT
WALKING IN HOSPITAL ROOM: A LOT
STANDING UP FROM CHAIR USING ARMS: A LITTLE
DRESSING REGULAR UPPER BODY CLOTHING: A LITTLE
TOILETING: A LITTLE
MOBILITY SCORE: 15
TURNING FROM BACK TO SIDE WHILE IN FLAT BAD: A LITTLE
TURNING FROM BACK TO SIDE WHILE IN FLAT BAD: A LITTLE
CLIMB 3 TO 5 STEPS WITH RAILING: A LOT
MOVING FROM LYING ON BACK TO SITTING ON SIDE OF FLAT BED WITH BEDRAILS: A LITTLE
DAILY ACTIVITIY SCORE: 20
STANDING UP FROM CHAIR USING ARMS: A LITTLE
CLIMB 3 TO 5 STEPS WITH RAILING: TOTAL
DRESSING REGULAR LOWER BODY CLOTHING: A LITTLE
CLIMB 3 TO 5 STEPS WITH RAILING: A LOT
WALKING IN HOSPITAL ROOM: A LOT
MOVING TO AND FROM BED TO CHAIR: A LOT
DAILY ACTIVITIY SCORE: 20
MOVING FROM LYING ON BACK TO SITTING ON SIDE OF FLAT BED WITH BEDRAILS: A LITTLE

## 2024-10-25 ASSESSMENT — PAIN SCALES - GENERAL
PAINLEVEL_OUTOF10: 3
PAINLEVEL_OUTOF10: 0 - NO PAIN
PAIN_LEVEL: 0
PAINLEVEL_OUTOF10: 5 - MODERATE PAIN
PAINLEVEL_OUTOF10: 0 - NO PAIN

## 2024-10-25 ASSESSMENT — ACTIVITIES OF DAILY LIVING (ADL)
FEEDING YOURSELF: INDEPENDENT
LACK_OF_TRANSPORTATION: NO
JUDGMENT_ADEQUATE_SAFELY_COMPLETE_DAILY_ACTIVITIES: YES
PATIENT'S MEMORY ADEQUATE TO SAFELY COMPLETE DAILY ACTIVITIES?: YES
TOILETING: INDEPENDENT
BATHING: INDEPENDENT
DRESSING YOURSELF: INDEPENDENT
ADEQUATE_TO_COMPLETE_ADL: YES
HEARING - RIGHT EAR: FUNCTIONAL
WALKS IN HOME: INDEPENDENT
LACK_OF_TRANSPORTATION: PATIENT DECLINED
ASSISTIVE_DEVICE: EYEGLASSES;WALKER
LACK_OF_TRANSPORTATION: PATIENT DECLINED
HEARING - LEFT EAR: FUNCTIONAL
GROOMING: INDEPENDENT

## 2024-10-25 ASSESSMENT — PAIN - FUNCTIONAL ASSESSMENT
PAIN_FUNCTIONAL_ASSESSMENT: 0-10
PAIN_FUNCTIONAL_ASSESSMENT: UNABLE TO SELF-REPORT
PAIN_FUNCTIONAL_ASSESSMENT: 0-10
PAIN_FUNCTIONAL_ASSESSMENT: UNABLE TO SELF-REPORT

## 2024-10-25 ASSESSMENT — COLUMBIA-SUICIDE SEVERITY RATING SCALE - C-SSRS
6. HAVE YOU EVER DONE ANYTHING, STARTED TO DO ANYTHING, OR PREPARED TO DO ANYTHING TO END YOUR LIFE?: NO
2. HAVE YOU ACTUALLY HAD ANY THOUGHTS OF KILLING YOURSELF?: NO
1. IN THE PAST MONTH, HAVE YOU WISHED YOU WERE DEAD OR WISHED YOU COULD GO TO SLEEP AND NOT WAKE UP?: NO

## 2024-10-25 ASSESSMENT — LIFESTYLE VARIABLES
HOW OFTEN DO YOU HAVE 6 OR MORE DRINKS ON ONE OCCASION: NEVER
SKIP TO QUESTIONS 9-10: 1
HOW MANY STANDARD DRINKS CONTAINING ALCOHOL DO YOU HAVE ON A TYPICAL DAY: 1 OR 2
HOW OFTEN DO YOU HAVE A DRINK CONTAINING ALCOHOL: MONTHLY OR LESS
AUDIT-C TOTAL SCORE: 1
AUDIT-C TOTAL SCORE: 1

## 2024-10-25 ASSESSMENT — PAIN DESCRIPTION - DESCRIPTORS: DESCRIPTORS: ACHING

## 2024-10-25 ASSESSMENT — PATIENT HEALTH QUESTIONNAIRE - PHQ9
2. FEELING DOWN, DEPRESSED OR HOPELESS: NOT AT ALL
1. LITTLE INTEREST OR PLEASURE IN DOING THINGS: NOT AT ALL
SUM OF ALL RESPONSES TO PHQ9 QUESTIONS 1 & 2: 0

## 2024-10-25 NOTE — PROGRESS NOTES
"Physical Therapy    Physical Therapy Evaluation & Treatment    Patient Name: Conrado Parker  MRN: [id number]  Today's Date: 10/25/2024   Time Calculation  Start Time: 1449  Stop Time: 1519  Time Calculation (min): 30 min  617/617-A    Assessment/Plan   PT Assessment  PT Assessment Results: Decreased strength, Impaired balance, Decreased mobility, Impaired sensation, Obesity, Orthopedic restrictions  Rehab Prognosis: Good  Barriers to Discharge: Pt. will likely need a 2ww prior to d/c (owns rollator)  End of Session Communication: Bedside nurse  Assessment Comment: Pt. is s/p THR POD#0 and is progressing as expected. Pt. will benefit from continued PT to increase mobility and indep.  End of Session Patient Position: Up in chair, Alarm on  IP OR SWING BED PT PLAN  Inpatient or Swing Bed: Inpatient  PT Plan  Treatment/Interventions: Bed mobility, Transfer training, Gait training, Stair training, Balance training, Strengthening, Therapeutic exercise, Therapeutic activity, Home exercise program  PT Plan: Ongoing PT  PT Frequency: BID  PT Discharge Recommendations: Low intensity level of continued care (with PRN assist)  Equipment Recommended upon Discharge: Wheeled walker  PT Recommended Transfer Status:  (assist x1-2 with ww)  PT - OK to Discharge: Yes (pending progress towards PT POD #1 goals and medical clearance)          Subjective     General Visit Information:  General  Reason for Referral: L THR  Referred By: Corby  Past Medical History Relevant to Rehab: OA, HLD, morbid obesity, trochanteric bursitis L hip, bursitis of elbow  Patient Position Received: Bed, 3 rail up, Alarm off, not on at start of session  General Comment: L hip OA s/p L THR Dr. Tavarez 10/25/24    Home Living:  Home Living  Home Living Comments: Lives with spouse (able to assist) in 2 story home with 1 (4\") LAZARA. Bed/bath on 1st floor. Owns rollator walker.    Prior Level of Function:  Prior Function Per Pt/Caregiver Report  Prior " "Function Comments: Indep. amb. without AD, however, was \"limping\" PTA, per pt; +drives. works full time as a . Denies any recent falls.    Precautions:  Precautions  LE Weight Bearing Status: Weight Bearing as Tolerated  Medical Precautions: Fall precautions  Post-Surgical Precautions: Left hip precautions (posterior)    Vital Signs:     Objective     Pain:  Pain Assessment  Pain Assessment: 0-10  0-10 (Numeric) Pain Score: 0 - No pain    Cognition:  Cognition  Orientation Level: Oriented X4    General Assessments:        Sensation  Sensation Comment: Pt. reports continued L thigh and buttock numbness from recent nerve block  Strength  Strength Comments: RLE and BUE WNL; L hip flex 3-/5, L quad and L ankle DF/PF 3+/5                   Functional Assessments:     Bed Mobility  Bed Mobility:  (supine to sit with minAx1)  Transfers  Transfer:  (sit to/from stand with chintan ww and modAx2; bed height slightly elevated and cues for safe technique. Difficulty with L hip extension during sit to stand, likely due to continued numbness)  Ambulation/Gait Training  Ambulation/Gait Training Performed:  (amb. bed to chair ~ 3 ft with chintan ww and modAx1; slow, step-to gait, low foot clearance)          Extremity/Trunk Assessments:        RLE   RLE : Within Functional Limits  LLE   LLE :  (L knee and ankle ROM WFL)    Treatments:   Reviewed hip precautions and handout issued. LE ther-ex HEP handout issued and ther-ex initiated: APs, L QS, L LAQ, GS     Outcome Measures:  [facility] Basic Mobility  Turning from your back to your side while in a flat bed without using bedrails: A little  Moving from lying on your back to sitting on the side of a flat bed without using bedrails: A little  Moving to and from bed to chair (including a wheelchair): A lot  Standing up from a chair using your arms (e.g. wheelchair or bedside chair): Total (2 person assist required, therefore, scored as a \"1\")  To walk in hospital room: A " lot  Climbing 3-5 steps with railing: Total  Basic Mobility - Total Score: 12                            Goals:  Encounter Problems       Encounter Problems (Active)       Impaired mobility s/p THR        Perform all bed mobility with indep.        Start:  10/25/24    Expected End:  11/08/24            Perform all transfers with ww and mod. indep.        Start:  10/25/24    Expected End:  11/08/24            Patient will ambulate >/= 100 ft with ww and mod. indep.        Start:  10/25/24    Expected End:  11/08/24            Patient will ascend/descend curb step with ww and mod. indep.        Start:  10/25/24    Expected End:  11/08/24            Patient will perform LE HEP with indep. to improve strength for functional mobility  (Progressing)       Start:  10/25/24    Expected End:  11/08/24                 Education Documentation  Home Exercise Program, taught by Kirsten Villatoro PT at 10/25/2024  6:26 PM.  Learner: Patient  Readiness: Acceptance  Method: Explanation, Demonstration, Handout  Response: Verbalizes Understanding, Demonstrated Understanding, Needs Reinforcement  Comment: see note    Education Comments  No comments found.

## 2024-10-25 NOTE — PROGRESS NOTES
10/25/24 1431   Discharge Planning   Living Arrangements Spouse/significant other   Support Systems Spouse/significant other   Assistance Needed none PTA, pt states PTA independent ADLS and IADLS no AD, drives, works FT, denies falls. Pt owns FWW, thinks he has a cane, walk-in shower with shower chair and grab bars   Type of Residence Private residence  (2 level home, bedroom/bathoom 1st floor)   Number of Stairs to Enter Residence 1   Number of Stairs Within Residence 12   Do you have animals or pets at home? Yes   Type of Animals or Pets 1 Cat   Home or Post Acute Services In home services   Type of Home Care Services Home PT;Home OT   Expected Discharge Disposition Home H  ([facility])   Does the patient need discharge transport arranged? No   Financial Resource Strain   How hard is it for you to pay for the very basics like food, housing, medical care, and heating? Not hard   Housing Stability   In the last 12 months, was there a time when you were not able to pay the mortgage or rent on time? N   In the past 12 months, how many times have you moved where you were living? 0   At any time in the past 12 months, were you homeless or living in a shelter (including now)? N   Transportation Needs   In the past 12 months, has lack of transportation kept you from medical appointments or from getting medications? no   In the past 12 months, has lack of transportation kept you from meetings, work, or from getting things needed for daily living? No   Patient Choice   Provider Choice list and CMS website (https://medicare.gov/care-compare#search) for post-acute Quality and Resource Measure Data were provided and reviewed with: Patient   Patient / Family choosing to utilize agency / facility established prior to hospitalization No     Pt is s/p Elective Left total hip arthoplasty posterior approach 10/25/24 with Dr. Jaylen Tavarez. Pt is weight bearing as tolerated with posterior hip precautions. PT/OT eval AMPAC scores  are currently pending. Pt states DC preference is home with HHC and agency of choice is [facility] Pts wife will be working at home x 2 weeks to assist as needed. Demographics verified. CNP notified and place HCO in [facility]. [facility]  notified of HHC referral/HCO in [facility] with ADOD 10/26/24, awaiting confirmation of SOC.

## 2024-10-25 NOTE — OP NOTE
Arthroplasty Total Hip Posterior Approach (L) Operative Note     Date: 10/25/2024  OR Location: [facility] OR    Name: Conrado Parker, : 1960, Age: 64 y.o., MRN: [id number], Sex: male    Diagnosis  Pre-op Diagnosis      * Unilateral primary osteoarthritis, left hip [M16.12] Post-op Diagnosis     * Unilateral primary osteoarthritis, left hip [M16.12]     Procedures  Arthroplasty Total Hip Posterior Approach  93427 - LA ARTHRP ACETBLR/PROX FEM PROSTC AGRFT/ALGRFT      Surgeons      * Jaylen Tavarez - Primary    Resident/Fellow/Other Assistant:  Surgeons and Role:     * Luis Diaz PA-C - Assisting    Procedure Summary  Anesthesia: Spinal  ASA: III  Anesthesia Staff: Anesthesiologist: Elvin Castro MD  CRNA: AUGUSTINE Ge-CRNA  Estimated Blood Loss: 100mL  Intra-op Medications:   Administrations occurring from 0930 to 1030 on 10/25/24:   Medication Name Total Dose   ropivacaine-epinephrine-clonidine-ketorolac 2.46-0.005- 0.0008-0.3mg/mL periarticular syringe 100 mL   sterile water irrigation solution 1,000 mL   bupivacaine PF 0.75 %-dextrose 8.25 % (Sensorcaine) intrathecal 2 mL   glycopyrrolate (Robinul) injection 0.2 mg/mL 0.2 mg   ondansetron 2 mg/mL 4 mg   propofol (Diprivan) infusion 10 mg/mL 550.5 mg   ceFAZolin (Ancef) 3 g in sodium chloride 0.9%  mL 3 g              Anesthesia Record               Intraprocedure I/O Totals          Intake    Propofol Drip 0.00 mL    The total shown is the total volume documented since Anesthesia Start was filed.    Total Intake 0 mL          Specimen: No specimens collected     Staff:   Circulator: Roosevelt  Scrub Person: Funmilayo         Drains and/or Catheters: * None in log *    Tourniquet Times:         Implants:  Implants       Type Name Action Serial No.      Joint SHELL, TRIDENT II, CLUSTERHOLE, HA 56F - [id number] Implanted      Joint INSERT, TRIDENT X3 POLYETHYLENE, 10 DEG, 36MM F - [id number] Implanted      Joint STEM, ACCOLADE II, 127DEG,SZ 6  - [id number] Implanted      Joint HEAD, FEMUR V40 36MM 0MM BIOLOX DELTA - [id number] Implanted               Findings: see procedure details    Indications: Conrado Parker is an 64 y.o. male who is having surgery for Unilateral primary osteoarthritis, left hip [M16.12].     The patient was seen in the preoperative area. The risks, benefits, complications, treatment options, non-operative alternatives, expected recovery and outcomes were discussed with the patient. The possibilities of reaction to medication, pulmonary aspiration, injury to surrounding structures, bleeding, recurrent infection, the need for additional procedures, failure to diagnose a condition, and creating a complication requiring transfusion or operation were discussed with the patient. The patient concurred with the proposed plan, giving informed consent.  The site of surgery was properly noted/marked if necessary per policy. The patient has been actively warmed in preoperative area. Preoperative antibiotics have been ordered and given within 1 hours of incision. Venous thrombosis prophylaxis have been ordered.    Procedure Details:   Preoperative diagnosis DJD hip  Postoperative diagnosis same  Procedure total hip replacement    Primary surgeon Jaylen Tavarez M.D.  Asst. Luis MALLORY certified    Implants  Cup56 Trident 2  Liner 10 degree elevated lip  Stem Accolade 2 uncemented size #6 neck angle 127  Head Biolox delta ceramic 36 mm neck length +0      Patient is suffering from chronic hip pain that has been refractory to conservative treatment and now presents for total hip replacement.  The risks benefits outcomes and postoperative course were fully explained to the patient.  We discussed where loosening infection blood clots loss of life loss of limb nerve damage numbness failure of the procedure progressive arthritis and the potential need for revision surgery.  The patient understands these risks and consents to the surgical  intervention.    The patient has pain in the hip that is increased with activity and weightbearing, and walks with an antalgic gait.  The pain is interfering with activities of daily living.  Patient has limited range of motion and pain with passive range of motion.  X-rays demonstrate joint space narrowing, subchondral sclerosis and osteophyte formation.  Symptoms are not improving with medication, physical therapy or supportive device for a period of at least 3 months.    The physician assistant was present through the entire case.  Given the nature of the disease process and the procedure to be performed skilled surgical first assistant was necessary during the case.  The assistant was necessary to hold retractors and manipulate the extremity during the procedure.  A certified scrub tech was at the back table managing the instruments and supplies for the surgical case.      Patient was brought to the operating room and placed on the surgical table in the supine position where adequate anesthesia was then obtained.  A surgical timeout was then performed.  The patient was positioned in the lateral decubitus position with the affected hip up being careful to pad all pressure points.  The patient was then prepped and draped in usual sterile manner.  A standard posterolateral approach to the hip was made and electrocauterization was used for hemostasis.  The IT band was split in line with its fibers and anterior and posterior retractors were then inserted.  The hip was maximally internally rotated and the short external rotators and piriformis tendon were taken down in 1 layer along with the joint capsule using the Bovie electrocautery device.  The capsule was teed in line with the piriformis tendon.  The hip was then dislocated and the femoral neck resection was made 1 cm above the lesser trochanter.  The femoral head showed signs of severe arthritic changes with joint calcification and loss of articular  cartilage.    Anterior and posterior acetabular retractors were then inserted and the acetabular labrum was sharply excised using a knife.  Next acetabulum was curetted to remove any remaining soft tissues and sequential reaming was performed.  Once final reaming was complete this had excellent circumferential fit and good bleeding bone porous acetabular shell was then inserted in approximately 45ï¿½ of abduction and 10-15ï¿½ of anteversion.  The acetabular shell was probed and stable and was completely seated.  The acetabular liner was then inserted and completely seated as well.  The liner was probed and stable.    At this point femoral preparation was begun.  A femoral neck retractor was inserted and anterior and posterior femoral retractors were inserted as well.  A box osteotome was used to start the femoral canal and a Charnley awl was inserted down the canal.  Sequential broaching was then performed being careful to keep the broaches in 10-15ï¿½ of anteversion.  With the final broach in position and completely seated calcar planing was performed.  A trial reduction was then performed and the hip was stable to an anatomic range of motion.  Trial components were then removed the final femoral stem was inserted and completely seated.  Femoral head and liner were applied to reduced.  Range of motion was again assessed and felt to be satisfactory leg lengths were assessed as well.  The joint was then irrigated with a copious amount of pulsatile irrigation.    Closure was begun using #2 Ethibond sutures through the joint capsule short external rotators and piriformis tendon that were tied through drill holes in the piriformis fossa of the femur.  The IT band was closed using a running locked #1 Vicryl suture.  3-0 Monocryl suture was used for the underlying subcutaneous tissues and 4-0 Monocryl suture was used for subcuticular stitch.  Skin glue was used for the skin and a dry sterile Aquacell dressing was used for  bandage.    The patient tolerated the procedure well and was taken to the postanesthesia care unit in satisfactory condition.  Postoperative x-rays were reviewed and surgical findings were discussed with the patient's family at the conclusion of the procedure.    This note was prepared using voice recognition software.  The details of this note are correct and have been reviewed, and corrected to the best of my ability.  Some grammatical areas may persist related to the Dragon software    Jaylen Tavarez MD    [phone]    Complications:  None; patient tolerated the procedure well.    Disposition: PACU - hemodynamically stable.  Condition: stable         Jaylen Tavarez  Phone Number: [phone]

## 2024-10-25 NOTE — INTERVAL H&P NOTE
History and physical is reviewed, patient re evaluated, no changes.  Procedure, risks, benefits, and postoperative course were discussed with the patient and consent is reviewed.    Jaylen Tavarez MD

## 2024-10-25 NOTE — ANESTHESIA PROCEDURE NOTES
Spinal Block    Patient location during procedure: OR  Start time: 10/25/2024 9:26 AM  End time: 10/25/2024 9:52 AM  Reason for block: primary anesthetic and at surgeon's request  Staffing  Performed: CRNA and attending   Authorized by: Elvin Castro MD    Performed by: AUGUSTINE Ge-CRNA    Preanesthetic Checklist  Completed: patient identified, IV checked, site marked, risks and benefits discussed, surgical consent, monitors and equipment checked, pre-op evaluation, timeout performed and sterile techniques followed  Block Timeout  RN/Licensed healthcare professional reads aloud to the Anesthesia provider and entire team: Patient identity, procedure with side and site, patient position, and as applicable the availability of implants/special equipment/special requirements.  Patient on coagulant treatment: no  Timeout performed at: 10/25/2024 9:26 AM  Spinal Block  Patient position: sitting  Prep: Betadine  Sterility prep: cap, drape, gloves, hand hygiene and mask  Sedation level: light sedation  Patient monitoring: blood pressure, continuous pulse oximetry, EKG, ETCO2 and heart rate  Approach: midline  Vertebral space: L4-5  Injection technique: single-shot  Needle  Needle type: Quincke   Needle gauge: 22 G  Needle length: 5 in  Free flowing CSF: yes    Assessment  Sensory level: T6 bilateral  Block outcome: patient comfortable  Procedure assessment: patient tolerated procedure well with no immediate complications  Additional Notes  1% lidocaine skin wheal to level, unsuccessfull attempts at L3-4 midline and paramedian by CRNA.  Successful midline L4-5 by attending.

## 2024-10-25 NOTE — DISCHARGE INSTRUCTIONS
Total Hip Replacement   Discharge Instructions    To prevent Clot formation, you have been placed on the following medication:  ASA 81 mg twice a day for 30 days started on 10/25/24    Surgical Site Care:  Change dressing once a day and PRN (as needed). Apply 4 x 4 sponge and light tape. If glue present, leave open to air.  You may leave wound open to air after initial dressing removal, if wound is clean, dry and intact  If Aquacel Ag dressing is present, do not remove dressing for 7 days, unless heavily saturated. If heavily saturated, remove dressing and start using instructions above  Staples will be removed on post-operative day 14 and steri-strips applied  Showering is permitted starting POD1 if waterproof Aquacel dressing is present or when the incision is covered with 4 x 4 and Tegaderm waterproof dressing  Until all areas of incision are healed.    Physical Therapy:  Weight Bearing Status:  Weight-bearing as tolerated  Posterior Hip precautions, per therapy handout     Pain Medications  You were given  oxycodone  Wean off pain medications as you deem appropriate as long as pain is under control  Do not exceed 4,000 mg of acetaminophen from all sources in a 24 hour period    Cold packs/Ice packs/Machine  May be used 5 times daily for 15-30 minutes as necessary  Be sure to have a barrier (cloth, clothing, towel) between the site and the ice pack to prevent frostbite    Contact [facility] office if  Increased redness, swelling, drainage of any kind, and/or pain to surgery site.  As well as new onset fevers and or chills.  These could signify an infection.  Calf or thigh tenderness to touch as well as increased swelling or redness.  This could signify a clot formation.  Numbness or tingling to an area around the incision site or below the incision site (toes).  Any rash appears, increased  or new onset nausea/vomiting occur.  This may indicate a reaction to a medication.  Phone #  [phone].  Follow up with Surgeon in 2 weeks  I acknowledge that I have received eda hose and understand the instructions on how and when to wear them (on during the day, off at night)   Discharging RN who has gone over instructions and acknowledges eda hose have been received     Ice 5 times a day for 20 minutes each session to operative extremity for two weeks.   EDA hose to be worn for three weeks. Can be removed for skin care and hygiene.   Incentive spirometer 10 times every hour while awake for two weeks.

## 2024-10-25 NOTE — ANESTHESIA POSTPROCEDURE EVALUATION
Patient: Conrado Parker    Procedure Summary       Date: 10/25/24 Room / Location: [facility] / [facility]    Anesthesia Start: 0926 Anesthesia Stop: 1110    Procedure: Arthroplasty Total Hip Posterior Approach (Left: Hip) Diagnosis:       Unilateral primary osteoarthritis, left hip      (Unilateral primary osteoarthritis, left hip [M16.12])    Surgeons: Jaylen Tavarez MD Responsible Provider: Elvin Castro MD    Anesthesia Type: spinal ASA Status: 3            Anesthesia Type: spinal    Vitals Value Taken Time   /63 10/25/24 1110   Temp 36.5 10/25/24 1110   Pulse 93 10/25/24 1110   Resp 10 10/25/24 1110   SpO2 96 10/25/24 1110       Anesthesia Post Evaluation    Patient location during evaluation: bedside  Patient participation: waiting for patient participation  Level of consciousness: sleepy but conscious  Pain score: 0  Pain management: adequate  Airway patency: patent  Cardiovascular status: acceptable and stable  Respiratory status: acceptable and face mask  Hydration status: acceptable  Postoperative Nausea and Vomiting: none      No notable events documented.

## 2024-10-26 ENCOUNTER — PHARMACY VISIT (OUTPATIENT)
Dept: PHARMACY | Facility: CLINIC | Age: 64
End: 2024-10-26
Payer: COMMERCIAL

## 2024-10-26 VITALS
OXYGEN SATURATION: 92 % | HEART RATE: 79 BPM | BODY MASS INDEX: 37.89 KG/M2 | SYSTOLIC BLOOD PRESSURE: 116 MMHG | WEIGHT: 279.76 LBS | HEIGHT: 72 IN | TEMPERATURE: 96.8 F | RESPIRATION RATE: 19 BRPM | DIASTOLIC BLOOD PRESSURE: 56 MMHG

## 2024-10-26 LAB
ANION GAP SERPL CALC-SCNC: 8 MMOL/L (ref 10–20)
BUN SERPL-MCNC: 21 MG/DL (ref 6–23)
CALCIUM SERPL-MCNC: 7.7 MG/DL (ref 8.6–10.3)
CHLORIDE SERPL-SCNC: 105 MMOL/L (ref 98–107)
CO2 SERPL-SCNC: 27 MMOL/L (ref 21–32)
CREAT SERPL-MCNC: 1.22 MG/DL (ref 0.5–1.3)
EGFRCR SERPLBLD CKD-EPI 2021: 67 ML/MIN/1.73M*2
ERYTHROCYTE [DISTWIDTH] IN BLOOD BY AUTOMATED COUNT: 13.6 % (ref 11.5–14.5)
GLUCOSE SERPL-MCNC: 107 MG/DL (ref 74–99)
HCT VFR BLD AUTO: 43.5 % (ref 41–52)
HGB BLD-MCNC: 13.9 G/DL (ref 13.5–17.5)
MCH RBC QN AUTO: 29.4 PG (ref 26–34)
MCHC RBC AUTO-ENTMCNC: 32 G/DL (ref 32–36)
MCV RBC AUTO: 92 FL (ref 80–100)
NRBC BLD-RTO: 0 /100 WBCS (ref 0–0)
PLATELET # BLD AUTO: 204 X10*3/UL (ref 150–450)
POTASSIUM SERPL-SCNC: 4.1 MMOL/L (ref 3.5–5.3)
RBC # BLD AUTO: 4.73 X10*6/UL (ref 4.5–5.9)
SODIUM SERPL-SCNC: 136 MMOL/L (ref 136–145)
WBC # BLD AUTO: 13.1 X10*3/UL (ref 4.4–11.3)

## 2024-10-26 PROCEDURE — 2500000002 HC RX 250 W HCPCS SELF ADMINISTERED DRUGS (ALT 637 FOR MEDICARE OP, ALT 636 FOR OP/ED): Performed by: ORTHOPAEDIC SURGERY

## 2024-10-26 PROCEDURE — 36415 COLL VENOUS BLD VENIPUNCTURE: CPT | Performed by: ORTHOPAEDIC SURGERY

## 2024-10-26 PROCEDURE — 2500000004 HC RX 250 GENERAL PHARMACY W/ HCPCS (ALT 636 FOR OP/ED): Performed by: ORTHOPAEDIC SURGERY

## 2024-10-26 PROCEDURE — 97116 GAIT TRAINING THERAPY: CPT | Mod: GP,CQ

## 2024-10-26 PROCEDURE — 85027 COMPLETE CBC AUTOMATED: CPT | Performed by: ORTHOPAEDIC SURGERY

## 2024-10-26 PROCEDURE — 97165 OT EVAL LOW COMPLEX 30 MIN: CPT | Mod: GO

## 2024-10-26 PROCEDURE — 99239 HOSP IP/OBS DSCHRG MGMT >30: CPT

## 2024-10-26 PROCEDURE — 97530 THERAPEUTIC ACTIVITIES: CPT | Mod: GP,CQ

## 2024-10-26 PROCEDURE — 7100000011 HC EXTENDED STAY RECOVERY HOURLY - NURSING UNIT

## 2024-10-26 PROCEDURE — 97110 THERAPEUTIC EXERCISES: CPT | Mod: GP,CQ

## 2024-10-26 PROCEDURE — 82374 ASSAY BLOOD CARBON DIOXIDE: CPT | Performed by: ORTHOPAEDIC SURGERY

## 2024-10-26 PROCEDURE — 2500000001 HC RX 250 WO HCPCS SELF ADMINISTERED DRUGS (ALT 637 FOR MEDICARE OP): Performed by: ORTHOPAEDIC SURGERY

## 2024-10-26 ASSESSMENT — COGNITIVE AND FUNCTIONAL STATUS - GENERAL
DRESSING REGULAR UPPER BODY CLOTHING: A LITTLE
MOVING TO AND FROM BED TO CHAIR: A LOT
PERSONAL GROOMING: A LITTLE
DAILY ACTIVITIY SCORE: 20
CLIMB 3 TO 5 STEPS WITH RAILING: A LOT
CLIMB 3 TO 5 STEPS WITH RAILING: A LITTLE
MOVING FROM LYING ON BACK TO SITTING ON SIDE OF FLAT BED WITH BEDRAILS: A LITTLE
HELP NEEDED FOR BATHING: A LITTLE
STANDING UP FROM CHAIR USING ARMS: A LITTLE
DRESSING REGULAR LOWER BODY CLOTHING: A LITTLE
DAILY ACTIVITIY SCORE: 21
DRESSING REGULAR LOWER BODY CLOTHING: A LITTLE
MOBILITY SCORE: 21
TOILETING: A LITTLE
WALKING IN HOSPITAL ROOM: A LITTLE
MOVING TO AND FROM BED TO CHAIR: A LITTLE
WALKING IN HOSPITAL ROOM: A LOT
TURNING FROM BACK TO SIDE WHILE IN FLAT BAD: A LITTLE
HELP NEEDED FOR BATHING: A LITTLE
MOBILITY SCORE: 15

## 2024-10-26 ASSESSMENT — PAIN SCALES - GENERAL
PAINLEVEL_OUTOF10: 3
PAINLEVEL_OUTOF10: 4
PAINLEVEL_OUTOF10: 4
PAINLEVEL_OUTOF10: 5 - MODERATE PAIN
PAINLEVEL_OUTOF10: 6
PAINLEVEL_OUTOF10: 4

## 2024-10-26 ASSESSMENT — PAIN - FUNCTIONAL ASSESSMENT
PAIN_FUNCTIONAL_ASSESSMENT: 0-10
PAIN_FUNCTIONAL_ASSESSMENT: 0-10

## 2024-10-26 ASSESSMENT — ACTIVITIES OF DAILY LIVING (ADL): BATHING_ASSISTANCE: STAND BY

## 2024-10-26 NOTE — DISCHARGE SUMMARY
Discharge summary  This patient Conrado Parker was admitted to the hospital on 10/25/2024  after undergoing Procedure(s) (LRB):  Arthroplasty Total Hip Posterior Approach (Left) without complications that morning.    During the postoperative period,while in hospital, patient was medically managed by the hospitalist. Please see medial notes and H&P for patients additional diagnoses.  Ortho agrees with all medical diagnoses and treatments while patient in hospital.  No significant or unexpected findings or abnormal ortho imaging were noted during the hospital stay    Hospital course      Patient tolerated surgical procedure well and there was no complications. Patient progressed adequately through their recovery during hospital stay including PT and rehabilitation.    Patient was then D/C on  to home  in stable condition.  Patient was instructed on the use of pain medications, the signs and symptoms of infection, and was given our number to call should they have any questions or concerns following discharge.    Based on my clinical judgment, the patient was provided with a 7-day prescription for opioid medication at 30 MED, indicated for treatment of acute pain in the setting of recent surgery. OARRS report was run and has demonstrated an appropriate time course.  The patient has been provided with counseling pertaining to safe use of opioid medication.    Patient may WBAT with posterior hip precautions to operative extremity with use of walker for assistance with ambulation   Mepilex dressing to be removed POD#7 and incision left open to air  Aspirin 81 mg BID for DVT prophylaxis started on 10/25/24 and to be taken for 30 days  Follow up with surgeon in 2 weeks

## 2024-10-26 NOTE — CARE PLAN
The patient's goals for the shift include      The clinical goals for the shift include pt will report pain at tolerable level thorughout shift    Over the shift, the patient did not make progress toward the following goals. Barriers to progression include pain management and ambulation. Recommendations to address these barriers include mediation monitoring and ambulation.

## 2024-10-26 NOTE — PROGRESS NOTES
Occupational Therapy    Evaluation    Patient Name: Conrado Parker  MRN: [id number]  Department: [facility]  Room: [address] [place] [address]  Today's Date: 10/26/2024  Time Calculation  Start Time: 0707  Stop Time: 0731  Time Calculation (min): 24 min        Assessment:  OT Assessment: CGA for steadying and safety with transfers/mobility. USe of reacher and AE for ADLs; SBA for safety  Prognosis: Good  Barriers to Discharge: None  Evaluation/Treatment Tolerance: Patient tolerated treatment well  End of Session Communication: Bedside nurse  End of Session Patient Position: Alarm on, Up in chair  Prognosis: Good  Barriers to Discharge: None  Evaluation/Treatment Tolerance: Patient tolerated treatment well  Plan:  No Skilled OT: No acute OT goals identified  OT Frequency: OT eval only  OT Discharge Recommendations: No further acute OT  Equipment Recommended upon Discharge:  (Reacher)  OT - OK to Discharge: Yes (when medically stable/cleared)       Subjective   Current Problem:  1. Unilateral primary osteoarthritis, left hip        2. Status post left hip replacement  Referral to Home Health    acetaminophen (Tylenol) 325 mg tablet    aspirin 81 mg EC tablet    cyclobenzaprine (Flexeril) 10 mg tablet    docusate sodium (Colace) 100 mg capsule    oxyCODONE (Roxicodone) 5 mg immediate release tablet        General:  General  Reason for Referral: Impairment s/p L THR  Referred By: KAYLEIGH Tavarez  Past Medical History Relevant to Rehab: OA, HLD, morbid obesity, trochanteric bursitis L hip, bursitis of elbow  Family/Caregiver Present: No  Prior to Session Communication: Bedside nurse  Patient Position Received: Alarm off, not on at start of session, Bed, 3 rail up  General Comment: Pt. is 63 y/o male s/p L THR with Dr. Tavarez 10/25/24  Precautions:  LE Weight Bearing Status: Weight Bearing as Tolerated  Medical Precautions: Fall precautions (L posterior hip precautions)    Pain:  Pain Assessment  Pain Assessment: 0-10  0-10 (Numeric) Pain  Score: 4  Pain Type: Surgical pain  Pain Location: Hip  Pain Orientation: Left    Objective   Cognition:  Overall Cognitive Status: Within Functional Limits  Orientation Level: Oriented X4     Home Living:  Home Living Comments: Pt. lives with wife in two story house, threshold step to enter garage into kitchen. Main floor set up with bed/bath. Has walk in shower with shower chair.  Prior Function:  Prior Function Comments: Pt. is independent with ADLs/IADLs PTA. No AD use. owns RW, does not own WW. No falls. Drives. Works as     ADL:  Eating Assistance: Independent  Grooming Assistance: Stand by  Bathing Assistance: Stand by  UE Dressing Assistance: Independent  LE Dressing Assistance: Stand by  LE Dressing Deficit: Requires assistive device for steadying, Steadying  Toileting Assistance with Device: Modified independent  Activity Tolerance:  Endurance: Decreased tolerance for upright activites  Activity Tolerance Comments: stiffness in L hip limiting endurance  Bed Mobility/Transfers: Bed Mobility  Bed Mobility: Yes  Bed Mobility 1  Bed Mobility 1: Supine to sitting  Level of Assistance 1: Minimum assistance  Bed Mobility Comments 1: VCs for technique; Min A to bring LEs to edge of bed increased time to complete bringing trunk to upright sit    Transfers  Transfer: Yes  Transfer 1  Technique 1: Sit to stand  Transfer Device 1: Walker (bariatric)  Transfer Level of Assistance 1: Contact guard  Trials/Comments 1: CGA for steadying, benefits from arm rests or higher surface. VCs for technique to maintain post hip precautions and safety      Functional Mobility:  Functional Mobility  Functional Mobility Performed:  (~25 feet with WW (bariatric). CGA for safety and steadying.)  Standing Balance:  Dynamic Standing Balance  Dynamic Standing-Comments: Fair +     Sensation:  Sensation Comment: WNL  Strength:  Strength Comments: Marcell 5/5 MMT    Coordination:  Movements are Fluid and Coordinated:  Yes  Coordination Comment: WNL   Hand Function:  Gross Grasp: Functional  Extremities: RUE   RUE : Within Functional Limits and LUE   LUE: Within Functional Limits      Outcome Measures:[facility] Daily Activity  Putting on and taking off regular lower body clothing: A little  Bathing (including washing, rinsing, drying): A little  Putting on and taking off regular upper body clothing: None  Toileting, which includes using toilet, bedpan or urinal: None  Taking care of personal grooming such as brushing teeth: A little  Eating Meals: None  Daily Activity - Total Score: 21      Education Documentation  Precautions, taught by Sarah Culver OT at 10/26/2024  7:44 AM.  Learner: Patient  Readiness: Acceptance  Method: Explanation  Response: Verbalizes Understanding, Demonstrated Understanding    ADL Training, taught by Sarah Culver OT at 10/26/2024  7:44 AM.  Learner: Patient  Readiness: Acceptance  Method: Explanation  Response: Verbalizes Understanding, Demonstrated Understanding    IP EDUCATION:  Education  Individual(s) Educated: Patient  Education Provided: Fall precautons, Risk and benefits of OT discussed with patient or other, POC discussed and agreed upon, Diagnosis & Precautions  Education Comment: Educated posterior hip precautions, ADL carryover, compensatory training, and safety with completion of transfers, ADLs, mobility

## 2024-10-26 NOTE — CARE PLAN
Problem: Pain  Goal: Free from opioid side effects throughout the shift  Outcome: Progressing   The patient's goals for the shift include      The clinical goals for the shift include pt safety

## 2024-10-26 NOTE — NURSING NOTE
Discussed discharge follow up precautions and when to call doctor with pt and spouse.. Iv removed with tip intact. Transport activated foe dc home

## 2024-10-26 NOTE — PROGRESS NOTES
Physical Therapy    Physical Therapy    Physical Therapy Treatment    Patient Name: Conrado Parker  MRN: [id number]  Today's Date: 10/26/2024  Time Calculation  Start Time: 0745  Stop Time: 0830  Time Calculation (min): 45 min       Assessment/Plan   PT Assessment  PT Assessment Results: Decreased strength, Impaired balance, Decreased mobility, Impaired sensation, Obesity, Orthopedic restrictions  Rehab Prognosis: Good  Barriers to Discharge: Pt. will likely need a 2ww prior to d/c (owns rollator)  End of Session Communication: Bedside nurse  Assessment Comment: Pt. is s/p THR POD#0 and is progressing as expected. Pt. will benefit from continued PT to increase mobility and indep.  End of Session Patient Position: Up in chair, Alarm on     PT Plan  Treatment/Interventions: Bed mobility, Transfer training, Gait training, Stair training, Balance training, Strengthening, Therapeutic exercise, Therapeutic activity, Home exercise program  PT Plan: Ongoing PT  PT Frequency: BID  PT Discharge Recommendations: Low intensity level of continued care (with PRN assist)  Equipment Recommended upon Discharge: Wheeled walker  PT Recommended Transfer Status:  (assist x1-2 with ww)      Current Problem:  1. Unilateral primary osteoarthritis, left hip        2. Status post left hip replacement  Referral to Home Health    acetaminophen (Tylenol) 325 mg tablet    aspirin 81 mg EC tablet    cyclobenzaprine (Flexeril) 10 mg tablet    docusate sodium (Colace) 100 mg capsule    oxyCODONE (Roxicodone) 5 mg immediate release tablet          General Visit Information:   PT  Visit  PT Received On: 10/26/24  General  Reason for Referral: L THR  Family/Caregiver Present: No  Prior to Session Communication: Bedside nurse  Patient Position Received: Up in chair, Alarm on  Preferred Learning Style: verbal  General Comment:  (Pt sitting up in recliner- ice to L hip.)  Subjective     Precautions:  Precautions  LE Weight Bearing Status: Weight Bearing  as Tolerated  Medical Precautions: Fall precautions  Post-Surgical Precautions: Left hip precautions  Precautions Comment: Discussed/ reviewed posterior hip precautions.         Objective     Pain:  Pain Assessment  Pain Assessment: 0-10  0-10 (Numeric) Pain Score: 3  Pain Type: Acute pain, Surgical pain  Pain Location: Hip  Pain Orientation: Left  Pain Interventions: Cold applied                        Treatments:  Therapeutic Exercise  Therapeutic Exercise Performed: Yes  Therapeutic Exercise Activity 1: Pt performed supine ther ex including ankle pumps, quad sets, glut sets, heel slides, AAROM hip ABD, SAQ 10 reps.  Therapeutic Exercise Activity 2: Pt performed seated ther ex  including LAQ, ABD/ADD pillow squeeze and Heel/toe raises 10 reps. Issued HEP for discharge. Verbal and tactile cues for technique and breathing.        Bed Mobility  Bed Mobility: Yes  Bed Mobility 1  Bed Mobility 1: Supine to sitting  Level of Assistance 1: Independent  Bed Mobility 2  Bed Mobility  2: Sitting to supine  Level of Assistance 2: Independent  Bed Mobility Comments 2:  (Pt performs bed mobility independently.)  Ambulation/Gait Training  Ambulation/Gait Training Performed: Yes  Ambulation/Gait Training 1  Surface 1: Level tile  Device 1: Rollator  Assistance 1: Modified independent  Quality of Gait 1: Inconsistent stride length  Comments/Distance (ft) 1:  (Pt ambulates with rollator WBAT with mod independent dale 50ftx2 with uneven strides with step throughg ait.)  Transfers  Transfer: Yes  Transfer 1  Technique 1: Sit to stand, Stand to sit  Transfer Device 1: Walker  Transfer Level of Assistance 1: Modified independent  Trials/Comments 1:  (Pt transfers with rollator WBAT with mod independence.)  Stairs  Stairs: Yes  Stairs  Curb Step 1: Yes  Device 1: Wheeled walker  Assistance 1: Close supervision  Comment/Number of Steps 1:  (Pt performs curb step with rollator WBAT with close supervision.)       Outcome Measures:  [facility]  Basic Mobility  Turning from your back to your side while in a flat bed without using bedrails: None  Moving from lying on your back to sitting on the side of a flat bed without using bedrails: None  Moving to and from bed to chair (including a wheelchair): A little  Standing up from a chair using your arms (e.g. wheelchair or bedside chair): None  To walk in hospital room: A little  Climbing 3-5 steps with railing: A little  Basic Mobility - Total Score: 21  Education Documentation  No documentation found.  Education Comments  No comments found.        EDUCATION:    Individual(s) Educated: Patient  Education Provided: Body Mechanics, Fall Risk, Home Exercise Program, Home Safety  Patient/Caregiver Demonstrated Understanding: yes  Patient Response to Education: Patient/Caregiver Verbalized Understanding of Information, Patient/Caregiver Performed Return Demonstration of Exercises/Activities, Patient/Caregiver Asked Appropriate Questions  Education Comment:  (Pt demonstrates good understanding of HEP and posterior hip precautions.  Good understanidng of safety awareness with use of rollator with transfers/ ambulation.)  Encounter Problems       Encounter Problems (Resolved)       Impaired mobility s/p THR        Perform all bed mobility with indep.  (Adequate for Discharge)       Start:  10/25/24    Expected End:  11/08/24    Resolved:  10/26/24         Perform all transfers with ww and mod. indep.  (Adequate for Discharge)       Start:  10/25/24    Expected End:  11/08/24    Resolved:  10/26/24         Patient will ambulate >/= 100 ft with ww and mod. indep.  (Adequate for Discharge)       Start:  10/25/24    Expected End:  11/08/24    Resolved:  10/26/24         Patient will ascend/descend curb step with ww and mod. indep.  (Adequate for Discharge)       Start:  10/25/24    Expected End:  11/08/24    Resolved:  10/26/24         Patient will perform LE HEP with indep. to improve strength for functional mobility   (Adequate for Discharge)       Start:  10/25/24    Expected End:  11/08/24    Resolved:  10/26/24

## 2024-10-26 NOTE — PROGRESS NOTES
Hip surgery    Progress Note    Subjective:     Post-Operative Day # 1   Status Post left Hip Arthroplasty    Systemic or Specific Complaints: No Complaints    Objective:     Visit Vitals  /56   Pulse 79   Temp 36 °C (96.8 °F)   Resp 19       General: alert and oriented, in no acute distress, appears stated age, and cooperative   Wound: no erythema, no edema, no drainage, and dressing remains clean, dry, and intact   Motion: Painful range of Motion   DVT Exam: No evidence of DVT seen on physical exam.  Negative Ginger's sign.  No significant calf/ankle edema.       NVI in lower extremity. left thigh soft to palpation. Strong equal dorsi/plantar flexion bilaterally. Good distal pulses bilaterally.      Data Review  Recent Results (from the past 24 hours)   CBC    Collection Time: 10/26/24  5:38 AM   Result Value Ref Range    WBC 13.1 (H) 4.4 - 11.3 x10*3/uL    nRBC 0.0 0.0 - 0.0 /100 WBCs    RBC 4.73 4.50 - 5.90 x10*6/uL    Hemoglobin 13.9 13.5 - 17.5 g/dL    Hematocrit 43.5 41.0 - 52.0 %    MCV 92 80 - 100 fL    MCH 29.4 26.0 - 34.0 pg    MCHC 32.0 32.0 - 36.0 g/dL    RDW 13.6 11.5 - 14.5 %    Platelets 204 150 - 450 x10*3/uL   Basic metabolic panel    Collection Time: 10/26/24  5:38 AM   Result Value Ref Range    Glucose 107 (H) 74 - 99 mg/dL    Sodium 136 136 - 145 mmol/L    Potassium 4.1 3.5 - 5.3 mmol/L    Chloride 105 98 - 107 mmol/L    Bicarbonate 27 21 - 32 mmol/L    Anion Gap 8 (L) 10 - 20 mmol/L    Urea Nitrogen 21 6 - 23 mg/dL    Creatinine 1.22 0.50 - 1.30 mg/dL    eGFR 67 >60 mL/min/1.73m*2    Calcium 7.7 (L) 8.6 - 10.3 mg/dL     XR hip left with pelvis when performed 1 view    Result Date: 10/25/2024  Interpreted By:  Hilton Omer, STUDY: XR HIP LEFT WITH PELVIS WHEN PERFORMED 1 VIEW;  10/25/2024 11:28 am   INDICATION: Signs/Symptoms:Post op hip.     COMPARISON: Pre-surgical Left hip and/or pelvis series, 20 December 2023   ACCESSION NUMBER(S): [id number]   ORDERING CLINICIAN: MARYA  KIRSTY   TECHNIQUE: Frontal view of the left hip obtained portably in the immediate or near-immediate postoperative setting   FINDINGS: No acute unexpected radiographic findings shortly after left hip replacement; refer to the operative report       As above     MACRO: None   Signed by: Hilton Kan 10/25/2024 11:52 AM Dictation workstation:   [id number]    ECG 12 lead (Ancillary Performed)    Result Date: 10/11/2024  Normal sinus rhythm Normal ECG No previous ECGs available Confirmed by Corbin Hernandez ([id number]) on 10/11/2024 11:54:50 AM      Assessment:     Status Post left Hip Arthroplasty. Doing well postoperatively. No acute events overnight.     Acute postoperative pain: Reports mild to moderate pain to operative extremity. Exacerbated by movement, relieved by rest ice and pain medication. Continue current pain management.     Leukocytosis: WBC count this morning 13.1.  No acute signs of infection.  Patient afebrile, remains hemodynamically stable denies any cough, abdominal pain, or urinary symptoms. Transient elevation in white blood count is most likely secondary to stress and inflammatory process from surgery.    Plan:      1.  Discharge today, Return to Clinic: in 2 weeks    2.  Continue Deep venous thrombosis prophylaxis: Aspirin 81 mg BID for 30 days  3.  Continue physical therapy: WBAT with posterior hip precautions to operative extremity  4.  Continue Pain Control: scripts sent  5.  Discharge planning: patient plans to return to home with  home care at discharge, orders placed. JOSE CRUZ and [facility] following for discharge planning.       REBECCA Martinez   10/26/2024 8:42 AM

## 2024-10-27 ENCOUNTER — HOME CARE VISIT (OUTPATIENT)
Dept: HOME HEALTH SERVICES | Facility: HOME HEALTH | Age: 64
End: 2024-10-27
Payer: COMMERCIAL

## 2024-10-27 VITALS
OXYGEN SATURATION: 94 % | TEMPERATURE: 98 F | DIASTOLIC BLOOD PRESSURE: 80 MMHG | SYSTOLIC BLOOD PRESSURE: 126 MMHG | HEART RATE: 76 BPM | RESPIRATION RATE: 14 BRPM

## 2024-10-27 PROCEDURE — G0151 HHCP-SERV OF PT,EA 15 MIN: HCPCS

## 2024-10-27 PROCEDURE — 0023 HH SOC

## 2024-10-27 SDOH — HEALTH STABILITY: PHYSICAL HEALTH: EXERCISE TYPE: LE EXERCISES

## 2024-10-27 SDOH — HEALTH STABILITY: PHYSICAL HEALTH: PHYSICAL EXERCISE: SEATED

## 2024-10-27 SDOH — HEALTH STABILITY: PHYSICAL HEALTH: EXERCISE ACTIVITIES SETS: 1

## 2024-10-27 SDOH — HEALTH STABILITY: PHYSICAL HEALTH: PHYSICAL EXERCISE: 15

## 2024-10-27 SDOH — HEALTH STABILITY: PHYSICAL HEALTH: EXERCISE ACTIVITY: LAQ

## 2024-10-27 SDOH — HEALTH STABILITY: PHYSICAL HEALTH: PHYSICAL EXERCISE: SUPINE

## 2024-10-27 SDOH — HEALTH STABILITY: PHYSICAL HEALTH: EXERCISE ACTIVITY: GLUT SETS

## 2024-10-27 SDOH — HEALTH STABILITY: PHYSICAL HEALTH: EXERCISE ACTIVITY: MARCHING

## 2024-10-27 SDOH — HEALTH STABILITY: PHYSICAL HEALTH: EXERCISE ACTIVITY: ANKLE DF/PF

## 2024-10-27 SDOH — HEALTH STABILITY: PHYSICAL HEALTH: EXERCISE ACTIVITY: HEEL SLIDES

## 2024-10-27 SDOH — HEALTH STABILITY: PHYSICAL HEALTH: EXERCISE ACTIVITY: HIP ABD/ADD SLIDES

## 2024-10-27 ASSESSMENT — ENCOUNTER SYMPTOMS
PAIN LOCATION - PAIN QUALITY: ACHING
HIGHEST PAIN SEVERITY IN PAST 24 HOURS: 7/10
SUBJECTIVE PAIN PROGRESSION: GRADUALLY IMPROVING
MUSCLE WEAKNESS: 1
PAIN: 1
PAIN LOCATION - EXACERBATING FACTORS: MOVEMENT
PERSON REPORTING PAIN: PATIENT
PAIN LOCATION: LEFT HIP
PAIN SEVERITY GOAL: 0/10
OCCASIONAL FEELINGS OF UNSTEADINESS: 1
PAIN LOCATION - RELIEVING FACTORS: ICE, REST, PAIN MEDS
LOWEST PAIN SEVERITY IN PAST 24 HOURS: 3/10
PAIN LOCATION - PAIN SEVERITY: 4/10
PAIN LOCATION - PAIN FREQUENCY: CONSTANT

## 2024-10-27 ASSESSMENT — ACTIVITIES OF DAILY LIVING (ADL)
OASIS_M1830: 03
AMBULATION ASSISTANCE ON FLAT SURFACES: 1
AMBULATION ASSISTANCE: STAND BY ASSIST
ENTERING_EXITING_HOME: STAND BY ASSIST
CURRENT_FUNCTION: STAND BY ASSIST
AMBULATION_DISTANCE/DURATION_TOLERATED: 50 FT X 1

## 2024-10-27 ASSESSMENT — BALANCE ASSESSMENTS
TURNING 360 DEGREES STEPS: 0 - DISCONTINUOUS STEPS
SITTING BALANCE: 1 - STEADY, SAFE
EYES CLOSED AT MAXIMUM POSITION NUDGED: 1 - STEADY
NUDGED SCORE: 2
BALANCE SCORE: 12
ATTEMPTS TO ARISE: 2 - ABLE TO RISE, ONE ATTEMPT
NUDGED: 2 - STEADY
ARISING SCORE: 1
IMMEDIATE STANDING BALANCE FIRST 5 SECONDS: 2 - STEADY WITHOUT WALKER OR OTHER SUPPORT
ARISES: 1 - ABLE, USES ARMS TO HELP
STANDING BALANCE: 1 - STEADY BUT WIDE STANCE AND USES CANE OR OTHER SUPPORT
SITTING DOWN: 1 - USES ARMS OR NOT SMOOTH MOTION

## 2024-10-27 ASSESSMENT — GAIT ASSESSMENTS
STEP SYMMETRY: 0 - RIGHT AND LEFT STEP LENGTH NOT EQUAL
STEP CONTINUITY: 0 - STOPPING OR DISCONTINUITY BETWEEN STEPS
INITIATION OF GAIT IMMEDIATELY AFTER GO: 1 - NO HESITANCY
GAIT SCORE: 6
PATH: 0 - MARKED DEVIATION
PATH SCORE: 0
TRUNK: 0 - MARKED SWAY OR USES WALKING AID
BALANCE AND GAIT SCORE: 18
TRUNK SCORE: 0
WALKING STANCE: 1 - HEELS ALMOST TOUCHING WHILE WALKING

## 2024-10-28 ENCOUNTER — HOME CARE VISIT (OUTPATIENT)
Dept: HOME HEALTH SERVICES | Facility: HOME HEALTH | Age: 64
End: 2024-10-28
Payer: COMMERCIAL

## 2024-10-28 PROCEDURE — G0152 HHCP-SERV OF OT,EA 15 MIN: HCPCS

## 2024-10-28 SDOH — ECONOMIC STABILITY: HOUSING INSECURITY: HOME SAFETY: LIVES IN 2 STORY HOME WITH WIFE AND CAT. 1ST FLOOR BEDROOM AND FULL BATHROOM.

## 2024-10-28 ASSESSMENT — ENCOUNTER SYMPTOMS
PAIN LOCATION - PAIN DURATION: CONSTANT
PAIN LOCATION - PAIN FREQUENCY: CONSTANT
PERSON REPORTING PAIN: PATIENT
PAIN SEVERITY GOAL: 0/10
PAIN: 1
HIGHEST PAIN SEVERITY IN PAST 24 HOURS: 5/10
LOWEST PAIN SEVERITY IN PAST 24 HOURS: 3/10
PAIN LOCATION - PAIN SEVERITY: 3/10
PAIN LOCATION: LEFT HIP
PAIN LOCATION - EXACERBATING FACTORS: THR SX
PAIN LOCATION - PAIN QUALITY: ACHES

## 2024-10-28 ASSESSMENT — ACTIVITIES OF DAILY LIVING (ADL)
TOILETING: SUPERVISION
AMBULATION ASSISTANCE: 1
LIGHT HOUSEKEEPING: DEPENDENT
GROOMING ASSESSED: 1
DRESSING_UB_CURRENT_FUNCTION: SUPERVISION
HOUSEKEEPING ASSESSED: 1
LAUNDRY: DEPENDENT
TOILETING: 1
PREPARING MEALS: DEPENDENT
BATHING ASSESSED: 1
GROOMING_CURRENT_FUNCTION: SUPERVISION
BATHING_CURRENT_FUNCTION: MINIMUM ASSIST
GROOMING EQUIPMENT USED: SETUP
AMBULATION ASSISTANCE: SUPERVISION
LAUNDRY ASSESSED: 1

## 2024-10-30 ENCOUNTER — HOME CARE VISIT (OUTPATIENT)
Dept: HOME HEALTH SERVICES | Facility: HOME HEALTH | Age: 64
End: 2024-10-30
Payer: COMMERCIAL

## 2024-10-30 PROCEDURE — G0157 HHC PT ASSISTANT EA 15: HCPCS | Mod: CQ

## 2024-10-30 ASSESSMENT — ENCOUNTER SYMPTOMS
PAIN LOCATION: LEFT HIP
PAIN: 1
PAIN LOCATION - PAIN SEVERITY: 3/10
PERSON REPORTING PAIN: PATIENT

## 2024-10-31 SDOH — HEALTH STABILITY: PHYSICAL HEALTH: EXERCISE TYPE: SUPINE LE STRENGTHENING

## 2024-10-31 SDOH — HEALTH STABILITY: PHYSICAL HEALTH: EXERCISE ACTIVITY: AP, QS, GS, HS, HIP ABD, SAQ

## 2024-10-31 SDOH — HEALTH STABILITY: PHYSICAL HEALTH: PHYSICAL EXERCISE: SUPINE

## 2024-10-31 SDOH — HEALTH STABILITY: PHYSICAL HEALTH: PHYSICAL EXERCISE: 15

## 2024-10-31 SDOH — HEALTH STABILITY: PHYSICAL HEALTH: EXERCISE ACTIVITIES SETS: 1

## 2024-10-31 ASSESSMENT — ACTIVITIES OF DAILY LIVING (ADL)
AMBULATION ASSISTANCE ON FLAT SURFACES: 1
AMBULATION_DISTANCE/DURATION_TOLERATED: 60 FT X2

## 2024-11-01 ENCOUNTER — HOME CARE VISIT (OUTPATIENT)
Dept: HOME HEALTH SERVICES | Facility: HOME HEALTH | Age: 64
End: 2024-11-01
Payer: COMMERCIAL

## 2024-11-01 PROCEDURE — G0157 HHC PT ASSISTANT EA 15: HCPCS | Mod: CQ

## 2024-11-01 SDOH — HEALTH STABILITY: PHYSICAL HEALTH: EXERCISE ACTIVITIES SETS: 1

## 2024-11-01 SDOH — HEALTH STABILITY: PHYSICAL HEALTH: EXERCISE ACTIVITY: HRTR, MARCHING, HIP ABD, EXT, HAM CURLS

## 2024-11-01 SDOH — HEALTH STABILITY: PHYSICAL HEALTH: EXERCISE TYPE: PROGRESSION OF STANDING THER EX

## 2024-11-01 SDOH — HEALTH STABILITY: PHYSICAL HEALTH: PHYSICAL EXERCISE: STANDING

## 2024-11-01 SDOH — HEALTH STABILITY: PHYSICAL HEALTH: PHYSICAL EXERCISE: 15

## 2024-11-01 ASSESSMENT — ACTIVITIES OF DAILY LIVING (ADL)
AMBULATION ASSISTANCE ON FLAT SURFACES: 1
AMBULATION_DISTANCE/DURATION_TOLERATED: 75 FT X2

## 2024-11-01 ASSESSMENT — ENCOUNTER SYMPTOMS
PAIN LOCATION - PAIN SEVERITY: 5/10
LOWEST PAIN SEVERITY IN PAST 24 HOURS: 3/10
PAIN: 1
PERSON REPORTING PAIN: PATIENT
PAIN LOCATION: LEFT HIP
HIGHEST PAIN SEVERITY IN PAST 24 HOURS: 7/10

## 2024-11-04 ENCOUNTER — HOME CARE VISIT (OUTPATIENT)
Dept: HOME HEALTH SERVICES | Facility: HOME HEALTH | Age: 64
End: 2024-11-04
Payer: COMMERCIAL

## 2024-11-04 PROCEDURE — G0157 HHC PT ASSISTANT EA 15: HCPCS | Mod: CQ

## 2024-11-04 SDOH — HEALTH STABILITY: PHYSICAL HEALTH: PHYSICAL EXERCISE: STANDING

## 2024-11-04 SDOH — HEALTH STABILITY: PHYSICAL HEALTH: EXERCISE ACTIVITIES SETS: 1

## 2024-11-04 SDOH — HEALTH STABILITY: PHYSICAL HEALTH: EXERCISE COMMENTS: PROGRESSED SHALLOW SQUATS  ADVISED TO BEGIN ALTERNATING LES FOR WB

## 2024-11-04 SDOH — HEALTH STABILITY: PHYSICAL HEALTH: EXERCISE ACTIVITY: HRTR, MARCHING, HIP ABD, EXT, HAM CURLS, SHALLOW SQUATS

## 2024-11-04 SDOH — HEALTH STABILITY: PHYSICAL HEALTH: EXERCISE TYPE: STANDING LE STRENGTHENING

## 2024-11-04 SDOH — HEALTH STABILITY: PHYSICAL HEALTH: PHYSICAL EXERCISE: 15

## 2024-11-04 ASSESSMENT — ENCOUNTER SYMPTOMS
HIGHEST PAIN SEVERITY IN PAST 24 HOURS: 8/10
PAIN: 1
PAIN LOCATION - PAIN SEVERITY: 3/10
PAIN LOCATION: LEFT HIP

## 2024-11-04 ASSESSMENT — ACTIVITIES OF DAILY LIVING (ADL)
AMBULATION_DISTANCE/DURATION_TOLERATED: 75 FT
AMBULATION ASSISTANCE ON FLAT SURFACES: 1

## 2024-11-06 ENCOUNTER — HOME CARE VISIT (OUTPATIENT)
Dept: HOME HEALTH SERVICES | Facility: HOME HEALTH | Age: 64
End: 2024-11-06
Payer: COMMERCIAL

## 2024-11-06 PROCEDURE — G0157 HHC PT ASSISTANT EA 15: HCPCS | Mod: CQ

## 2024-11-06 SDOH — HEALTH STABILITY: PHYSICAL HEALTH: EXERCISE ACTIVITIES SETS: 1

## 2024-11-06 SDOH — HEALTH STABILITY: PHYSICAL HEALTH: EXERCISE TYPE: FINALIZED HEP FOR STRENGTHENING IN STANDING

## 2024-11-06 SDOH — HEALTH STABILITY: PHYSICAL HEALTH: EXERCISE ACTIVITY: STANDING LE STRENGTHENING

## 2024-11-06 SDOH — HEALTH STABILITY: PHYSICAL HEALTH: PHYSICAL EXERCISE: 15

## 2024-11-06 ASSESSMENT — ENCOUNTER SYMPTOMS
PAIN LOCATION - PAIN SEVERITY: 6/10
PAIN LOCATION: LEFT HIP
PERSON REPORTING PAIN: PATIENT
PAIN: 1

## 2024-11-06 ASSESSMENT — ACTIVITIES OF DAILY LIVING (ADL)
AMBULATION_DISTANCE/DURATION_TOLERATED: 150 FT
AMBULATION ASSISTANCE ON FLAT SURFACES: 1

## 2024-11-11 ENCOUNTER — HOME CARE VISIT (OUTPATIENT)
Dept: HOME HEALTH SERVICES | Facility: HOME HEALTH | Age: 64
End: 2024-11-11
Payer: COMMERCIAL

## 2024-11-11 VITALS
SYSTOLIC BLOOD PRESSURE: 144 MMHG | TEMPERATURE: 97.5 F | OXYGEN SATURATION: 97 % | HEART RATE: 75 BPM | DIASTOLIC BLOOD PRESSURE: 82 MMHG | RESPIRATION RATE: 14 BRPM

## 2024-11-11 PROCEDURE — G0151 HHCP-SERV OF PT,EA 15 MIN: HCPCS

## 2024-11-11 SDOH — HEALTH STABILITY: PHYSICAL HEALTH: EXERCISE ACTIVITY: HIP ABDUCTION

## 2024-11-11 SDOH — HEALTH STABILITY: PHYSICAL HEALTH: PHYSICAL EXERCISE: 15

## 2024-11-11 SDOH — HEALTH STABILITY: PHYSICAL HEALTH: EXERCISE ACTIVITY: HS CURLS

## 2024-11-11 SDOH — HEALTH STABILITY: PHYSICAL HEALTH: EXERCISE ACTIVITY: MARCHING

## 2024-11-11 SDOH — HEALTH STABILITY: PHYSICAL HEALTH: EXERCISE ACTIVITY: MINI SQUATS

## 2024-11-11 SDOH — HEALTH STABILITY: PHYSICAL HEALTH: PHYSICAL EXERCISE: STANDING

## 2024-11-11 SDOH — HEALTH STABILITY: PHYSICAL HEALTH: EXERCISE ACTIVITIES SETS: 1

## 2024-11-11 SDOH — HEALTH STABILITY: PHYSICAL HEALTH: EXERCISE ACTIVITY: HIP EXTENSION

## 2024-11-11 SDOH — HEALTH STABILITY: PHYSICAL HEALTH: EXERCISE ACTIVITY: HEEL RAISES/TOE RAISES

## 2024-11-11 SDOH — HEALTH STABILITY: PHYSICAL HEALTH: EXERCISE TYPE: LE EXERCISES

## 2024-11-11 ASSESSMENT — BALANCE ASSESSMENTS
ARISING SCORE: 1
NUDGED: 2 - STEADY
SITTING BALANCE: 1 - STEADY, SAFE
ATTEMPTS TO ARISE: 2 - ABLE TO RISE, ONE ATTEMPT
SITTING DOWN: 1 - USES ARMS OR NOT SMOOTH MOTION
BALANCE SCORE: 13
IMMEDIATE STANDING BALANCE FIRST 5 SECONDS: 2 - STEADY WITHOUT WALKER OR OTHER SUPPORT
ARISES: 1 - ABLE, USES ARMS TO HELP
EYES CLOSED AT MAXIMUM POSITION NUDGED: 1 - STEADY
TURNING 360 DEGREES STEPS: 1 - CONTINUOUS STEPS
NUDGED SCORE: 2
STANDING BALANCE: 1 - STEADY BUT WIDE STANCE AND USES CANE OR OTHER SUPPORT

## 2024-11-11 ASSESSMENT — ENCOUNTER SYMPTOMS
PAIN LOCATION - PAIN SEVERITY: 2/10
PERSON REPORTING PAIN: PATIENT
LOWEST PAIN SEVERITY IN PAST 24 HOURS: 2/10
HIGHEST PAIN SEVERITY IN PAST 24 HOURS: 4/10
PAIN: 1
PAIN LOCATION - PAIN QUALITY: DULL, ACHING
OCCASIONAL FEELINGS OF UNSTEADINESS: 1
PAIN LOCATION - PAIN FREQUENCY: INTERMITTENT
PAIN SEVERITY GOAL: 0/10
PAIN LOCATION - EXACERBATING FACTORS: WALKING
PAIN LOCATION: LEFT HIP
PAIN LOCATION - RELIEVING FACTORS: ICE, TYLENOL

## 2024-11-11 ASSESSMENT — GAIT ASSESSMENTS
TRUNK SCORE: 1
PATH: 1 - MILD/MODERATE DEVIATION OR USES WALKING AID
INITIATION OF GAIT IMMEDIATELY AFTER GO: 1 - NO HESITANCY
WALKING STANCE: 1 - HEELS ALMOST TOUCHING WHILE WALKING
GAIT SCORE: 10
TRUNK: 1 - NO SWAY BUT FLEXION OF KNEES OR BACK OR SPREADS ARMS WHILE WALKING
STEP CONTINUITY: 1 - STEPS APPEAR CONTINUOUS
BALANCE AND GAIT SCORE: 23
STEP SYMMETRY: 1 - RIGHT AND LEFT STEP LENGTH APPEAR EQUAL
PATH SCORE: 1

## 2024-11-11 ASSESSMENT — ACTIVITIES OF DAILY LIVING (ADL)
AMBULATION ASSISTANCE ON FLAT SURFACES: 1
OASIS_M1830: 01
HOME_HEALTH_OASIS: 00
AMBULATION_DISTANCE/DURATION_TOLERATED: 150 FT

## 2024-11-12 ENCOUNTER — OFFICE VISIT (OUTPATIENT)
Dept: ORTHOPEDIC SURGERY | Facility: CLINIC | Age: 64
End: 2024-11-12
Payer: COMMERCIAL

## 2024-11-12 ENCOUNTER — EVALUATION (OUTPATIENT)
Dept: PHYSICAL THERAPY | Facility: CLINIC | Age: 64
End: 2024-11-12
Payer: COMMERCIAL

## 2024-11-12 ENCOUNTER — HOSPITAL ENCOUNTER (OUTPATIENT)
Dept: RADIOLOGY | Facility: CLINIC | Age: 64
Discharge: HOME | End: 2024-11-12
Payer: COMMERCIAL

## 2024-11-12 DIAGNOSIS — Z96.642 AFTERCARE FOLLOWING LEFT HIP JOINT REPLACEMENT SURGERY: Primary | ICD-10-CM

## 2024-11-12 DIAGNOSIS — M16.12 PRIMARY OSTEOARTHRITIS OF LEFT HIP: ICD-10-CM

## 2024-11-12 DIAGNOSIS — Z47.1 AFTERCARE FOLLOWING LEFT HIP JOINT REPLACEMENT SURGERY: Primary | ICD-10-CM

## 2024-11-12 DIAGNOSIS — M16.12 UNILATERAL PRIMARY OSTEOARTHRITIS, LEFT HIP: Primary | ICD-10-CM

## 2024-11-12 PROCEDURE — 97161 PT EVAL LOW COMPLEX 20 MIN: CPT | Mod: GP

## 2024-11-12 PROCEDURE — 73502 X-RAY EXAM HIP UNI 2-3 VIEWS: CPT | Mod: LEFT SIDE | Performed by: ORTHOPAEDIC SURGERY

## 2024-11-12 PROCEDURE — 99211 OFF/OP EST MAY X REQ PHY/QHP: CPT | Performed by: ORTHOPAEDIC SURGERY

## 2024-11-12 PROCEDURE — 73502 X-RAY EXAM HIP UNI 2-3 VIEWS: CPT | Mod: LT

## 2024-11-12 PROCEDURE — 97110 THERAPEUTIC EXERCISES: CPT | Mod: GP

## 2024-11-12 NOTE — PROGRESS NOTES
History of present illness    64-year-old gentleman here for follow-up of his left total hip replacement from October 25 he states that this is coming along very well no fevers or chills no redness or drainage he was having some issues with constipation after the surgery but this seems to have resolved itself now he has been doing home physical therapy due to transition to outpatient physical therapy today.      Past medical , Surgical, Family and social history reviewed.      Physical exam  General: No acute distress and breathing comfortably.  Patient is pleasant and cooperative with the examination.    Extremity  Incisions well-approximated outside infection there is no redness no drainage compartments are soft calf is nontender no pain with internal ex rotation brisk cap refill    Diagnostics      XR hip left with pelvis when performed 2 or 3 views    Result Date: 11/12/2024  Interpreted By:  Marya Tavarez, STUDY: XR HIP LEFT WITH PELVIS WHEN PERFORMED 2 OR 3 VIEWS; 11/12/2024 8:32 am   INDICATION: Signs/Symptoms:pain.   ACCESSION NUMBER(S): [id number]   ORDERING CLINICIAN: MARYA TAVAREZ   FINDINGS: Two views show patient status post total hip replacement in good alignment.  No signs of fracture dislocation or other bony abnormality       Signed by: Marya Tavarez 11/12/2024 8:45 AM Dictation workstation:   [id number]    XR hip left with pelvis when performed 1 view    Result Date: 10/25/2024  Interpreted By:  Hilton Omer, STUDY: XR HIP LEFT WITH PELVIS WHEN PERFORMED 1 VIEW;  10/25/2024 11:28 am   INDICATION: Signs/Symptoms:Post op hip.     COMPARISON: Pre-surgical Left hip and/or pelvis series, 20 December 2023   ACCESSION NUMBER(S): [id number]   ORDERING CLINICIAN: MARYA TAVAREZ   TECHNIQUE: Frontal view of the left hip obtained portably in the immediate or near-immediate postoperative setting   FINDINGS: No acute unexpected radiographic findings shortly after left  hip replacement; refer to the operative report       As above     MACRO: None   Signed by: Hilton Omer 10/25/2024 11:52 AM Dictation workstation:   [id number]       Procedure  [ none]    Assessment  Status post left total hip    Treatment plan  1.  Continue work on range of motion strengthening continue to weight-bear as tolerated follow-up with me 3 weeks  2. [   ]  3. [   ]  4.  All of the patient's questions were answered.    Orders Placed This Encounter    XR hip left with pelvis when performed 2 or 3 views       This note was prepared using voice recognition software.  The details of this note are correct and have been reviewed, and corrected to the best of my ability.  Some grammatical areas may persist related to the Dragon software    Jaylen Tavarez MD  Senior Attending Physician  [facility]  [facility] [place]    [phone]

## 2024-11-12 NOTE — PROGRESS NOTES
Physical Therapy Evaluation    Patient Name: Conrado Parker  MRN: [id number]  Time Calculation  Start Time: 1000  Stop Time: 1038  Time Calculation (min): 38 min  PT Evaluation Time Entry  PT Evaluation (Low) Time Entry: 10  PT Therapeutic Procedures Time Entry  Therapeutic Exercise Time Entry: 28                   Current Problem  1. Unilateral primary osteoarthritis, left hip          Insurance    Insurance reviewed   Visit number: 1  Approved number of visits: BMN   [employer] MED[employer]ARE BMN PT OT COPAY 0 DED 0  COVERAGE 80 OOP [id number]) NO AUTH REQ       Subjective   General:  Pt reports to the clinic s/p L BRIDGET posterior approach on 10/25/24 with Dr. Tavarez. L hip had been bothering him for for a few years with no specific BO. Pt would like to return to working, hunting, and fishing following completion of PT. Patient denies any abnormal/calf pain, acute/abnormal lower leg swelling, shortness of breath or any overt signs of DVT/PE. Patient also denies any acute irritation of surgical site, no odorous/abnormally colored discharge or overt signs of infection. Denies any falls. He did have follow up with Dr. Tavarez today before PT and he had no complaints. Pt demos understanding of posterior hip precautions. He has been getting up and moving every hour and sleeping with adduction pillow. Pt currently ambulating with rollator and will be transferring himself to cane.     Occupation:     Lifestyle: hunting, fishing     Living Environment:   Stairs into Home: 1 steps  Stairs in Home: 1 flight with a railing to second floor and do not have to use; first floor bed and bathy  Pt lives with his wife.     Precautions:  L hip posterior precautions     Pain:  Description of Symptoms:    Pain: dull   Location: around incision site   Severity: current- 4/10  At best- 2/10  At worst- 4/10   Intensity Variation: end of day pain increases   Duration of Pain: constant   Aggravating Factors: hip precautions, sitting  long periods  Relieving Factors: icing, pain meds    Associated Symptoms: no numbness or tingling     Reviewed medical screening form with pt and medical screening assessed    Imaging:     Objective   Hip Musculoskeletal Exam  Gait    Antalgic: left    Assistive device: cane (and rollator that he is slowly transitioning out of)    Inspection    Left      Incision: clean, dry and erythema      Incisional drainage: none    Palpation    Left      Tenderness: present (around incision)    Range of Motion    Right      Right hip range of motion is within functional limits.     Left      Active ROM: no pain.        Passive ROM: no pain.        Active flexion: 90.       Left hip active internal rotation: NT.       Active external rotation: 10.       Passive external rotation: 15.       Left hip active adduction: NT.       Active abduction: 10.       Passive abduction: 15.     Strength    Right      Right hip strength is normal.     Left      Extension: 3/5.       External rotation: 3/5.       Abduction: 3/5.     Strength additional comments: Flexion, IR, and adduction NT     Special Tests    Special tests additional comments: SLR: impaired/unable         Outcome Measures:  Other Measures  Lower Extremity Funtional Score (LEFS): 31     Treatment:   Manual: PROM for hip ER and flexion     Measures for eval     EDUCATION/HEP:  -posterior hip precautions, reviewed     Access Code: [email or web address]  URL: [email or web address]  Date: 11/12/2024  Prepared by: Chhaya Galeana    Exercises  - Supine Heel Slide with Strap  - 1 x daily - 4 x weekly - 2 sets - 10 reps  - Supine Quad Set  - 1 x daily - 4 x weekly - 2 sets - 10 reps  - Hooklying Clamshell with Resistance  - 1 x daily - 4 x weekly - 2 sets - 10 reps  - Supine Bridge  - 1 x daily - 4 x weekly - 2 sets - 10 reps  - Supine Hip Adduction Isometric with Ball  - 1 x daily - 4 x weekly - 2 sets - 10 reps  - Supine Knee Extension Strengthening  - 1 x daily - 4 x  weekly - 2 sets - 10 reps  - Supine Figure 4 Piriformis Stretch  - 1 x daily - 4 x weekly - 2 sets - 30 hold (gentle)     Assessment:  Pt reports to the clinic s/p L BRIDGET posterior approach on 10/25/24 with Dr. Tavarez. L hip had been bothering him for for a few years with no specific BO. Pt would like to return to working, hunting, and fishing following completion of PT. Patient denies any abnormal/calf pain, acute/abnormal lower leg swelling, shortness of breath or any overt signs of DVT/PE. Patient also denies any acute irritation of surgical site, no odorous/abnormally colored discharge or overt signs of infection. Denies any falls. He did have follow up with Dr. Tavarez today before PT and he had no complaints. Pt demos understanding of posterior hip precautions. He has been getting up and moving every hour and sleeping with adduction pillow. Pt currently ambulating with rollator and will be transferring himself to cane. Hip flexion, IR, and adduction MMT and active ROM not tested d/t precautions. Pt able to tolerate all exercises in POC with no complaint. He stands to benefit from skilled PT in order to improve gait, ROM, strength and balance in LLE in order to return to PLOF.     Clinical Presentation: Stable    Level of Complexity: Low    Goals:  Pt will be independent with advanced HEP   Pt will improve LLE strength to at least 4+/5 in order to facilitate improved stability with all functional mobility  Pt will demo full and symmetrical hip ROM to normalize mechanics with all functional mobility  Pt will demo L LE SLS >/=10 sec without UE assist on compliant surface for functional carryover into dynamic balance  Pt will negotiate flight of stairs mod I reciprocally without increased hip pain  Pt will ambulate community distances independent over varying surfaces/inclines without increased L hip pain  Pt will improve LEFS score by at least 9 points (MCID) to indicate significant improvement in functional  abilities.     Plan  1x/week for 8 visits     Skilled therapeutic intervention to address the above mentioned physical and functional impairments and limitations including, but not limited to: patient education, therapeutic exercise, therapeutic activity, manual therapy, body mechanics training, dry needling, blood flow restriction training, instrumented soft tissue mobilization, manual soft tissue mobilization, gait retraining, biofeedback, cryotherapy, electrical stimulation, home program development, hot pack, taping, neuromuscular re-education, self-care/home management, and vasopneumatic compression.

## 2024-11-22 ENCOUNTER — TREATMENT (OUTPATIENT)
Dept: PHYSICAL THERAPY | Facility: CLINIC | Age: 64
End: 2024-11-22
Payer: COMMERCIAL

## 2024-11-22 DIAGNOSIS — M16.12 UNILATERAL PRIMARY OSTEOARTHRITIS, LEFT HIP: Primary | ICD-10-CM

## 2024-11-22 PROCEDURE — 97110 THERAPEUTIC EXERCISES: CPT | Mod: GP,CQ

## 2024-11-22 ASSESSMENT — PAIN SCALES - GENERAL: PAINLEVEL_OUTOF10: 4

## 2024-11-22 ASSESSMENT — PAIN - FUNCTIONAL ASSESSMENT: PAIN_FUNCTIONAL_ASSESSMENT: 0-10

## 2024-11-22 ASSESSMENT — PAIN DESCRIPTION - DESCRIPTORS: DESCRIPTORS: TIGHTNESS;ACHING

## 2024-11-22 NOTE — PROGRESS NOTES
Physical Therapy Treatment    Patient Name: Conrado Parker  MRN: [id number]  Today's Date: 11/22/2024  Time Calculation  Start Time: 0915  Stop Time: 0954  Time Calculation (min): 39 min  PT Therapeutic Procedures Time Entry  Therapeutic Exercise Time Entry: 38       Current Problem  1. Unilateral primary osteoarthritis, left hip            Subjective   General   Pt states he feels okay today. Pain is more when walking than while sitting. Pt states some numbness/tingling in his LLE with prolonged sitting, but once he repositions the numbness/tingling subsides.    Insurance reviewed   Visit number: 2  Approved number of visits: BMN   [employer] MEDICARE BMN PT OT COPAY 0 DED 0  COVERAGE 80 OOP [id number]) NO AUTH REQ    Precautions  L hip posterior precaution     Pain  Pain Assessment: 0-10  0-10 (Numeric) Pain Score: 4  Pain Type: Surgical pain, Acute pain  Pain Location: Hip  Pain Orientation: Left  Pain Descriptors: Tightness, Aching    Objective   Treatments:  YEHUDA 8'  SLR/ABD 2x10e  RTB Clamshells 2x10  Bridges x20  LAQ 3x10  HR/TR x20e  Mini Squats 2x10  Standing HSC 3x10  B Hip 3-way, 15e, 1x, R hip flexion unable to tolerate SLS on L  Standing March x30    Assessment   Pt ambulated into the clinic today with a SPC on his R side. Pt was unable to tolerate hip flexion on the RLE while SLS on the LLE. Focused treatment on gaining more strength and ROM in the L hip. Pt tolerated treatment well but did require brief rest throughout the treatment session due to fatigue. Continue to progress strength in the LLE for ease of everyday activity.    Plan:  Cont to progress strength and endurance       Session Completed and Documented By: Giacomo Fuentes    OP EDUCATION:  - Mini Squat with Counter Support  - 1 x daily - 7 x weekly - 2-3 sets - 10 reps  - Standing 3-Way Leg Reach with Resistance at Ankles and Counter Support  - 1 x daily - 7 x weekly - 2-3 sets - 10 reps  - Heel Toe Raises with Counter Support  - 1 x daily - 7 x  weekly - 2-3 sets - 10 reps  - Standing March with Counter Support  - 1 x daily - 7 x weekly - 2-3 sets - 10 reps  - Standing Single Leg Stance with Counter Support  - 1 x daily - 7 x weekly - 1 sets - 5-10 reps - 10-20 seconds hold  - Standing Knee Flexion AROM with Chair Support  - 1 x daily - 7 x weekly - 2-3 sets - 10 reps  - Seated Long Arc Quad  - 1 x daily - 7 x weekly - 2-3 sets - 10 reps - 3-5 seconds hold  - Supine Bridge  - 1 x daily - 7 x weekly - 2-3 sets - 10 reps - 3-5 seconds hold  - Clamshell with Resistance  - 1 x daily - 7 x weekly - 2-3 sets - 10 reps  - Sidelying Hip Abduction  - 1 x daily - 7 x weekly - 2-3 sets - 10 reps  - Active Straight Leg Raise with Quad Set  - 1 x daily - 7 x weekly - 2-3 sets - 10 reps    Goals:

## 2024-11-27 ENCOUNTER — TREATMENT (OUTPATIENT)
Dept: PHYSICAL THERAPY | Facility: CLINIC | Age: 64
End: 2024-11-27
Payer: COMMERCIAL

## 2024-11-27 DIAGNOSIS — M16.12 UNILATERAL PRIMARY OSTEOARTHRITIS, LEFT HIP: Primary | ICD-10-CM

## 2024-11-27 PROCEDURE — 97110 THERAPEUTIC EXERCISES: CPT | Mod: GP,CQ

## 2024-11-27 NOTE — PROGRESS NOTES
"Physical Therapy Treatment    Patient Name: Conrado Parker  MRN: [id number]  Today's Date: 11/27/2024  Time Calculation  Start Time: 1000  Stop Time: 1041  Time Calculation (min): 41 min  PT Therapeutic Procedures Time Entry  Therapeutic Exercise Time Entry: 40       Insurance reviewed   Visit number: 3/8  Approved number of visits: BMN   [employer] MEDICARE BMN PT OT COPAY 0 DED 0  COVERAGE 80 OOP [id number]) NO AUTH REQ    Current Problem  1. Unilateral primary osteoarthritis, left hip            Subjective   General   Pt. Reports he feels like he is getting better and is not really using his cane anymore.   Precautions     Pain       Objective   Treatments:   YEHUDA 8'  SLR/ABD 2x10e  RTB Clamshells 2x10  Bridges RTB x20  LAQ 3x10  HR/TR 1/2 roll x20  STS 2x10  Standing HSC 3x10  B Hip 3-way RTB x15  Standing March Blk Foam x30  Step ups F/L 6\" x20  Step overs 4\" x20  Tapdowns 4\" x20  SLS BL Foam 10\"x10    Assessment:   Pt. Progressing w/ all exercises well. Added step ups, step overs, tapdowns, and SLS for increasing eccentric strength and balance w/ good pt. Tolerance. Pt. Just had expected fatigue after treatment today w/o much pain. Pt. Given updated HEP.     Plan:   Continue to increase strength/mobility.     OP EDUCATION:   Access Code: [email or web address]    Goals:     "

## 2024-12-03 ENCOUNTER — OFFICE VISIT (OUTPATIENT)
Dept: ORTHOPEDIC SURGERY | Facility: CLINIC | Age: 64
End: 2024-12-03
Payer: COMMERCIAL

## 2024-12-03 DIAGNOSIS — Z96.642 AFTERCARE FOLLOWING LEFT HIP JOINT REPLACEMENT SURGERY: Primary | ICD-10-CM

## 2024-12-03 DIAGNOSIS — Z47.1 AFTERCARE FOLLOWING LEFT HIP JOINT REPLACEMENT SURGERY: Primary | ICD-10-CM

## 2024-12-03 PROCEDURE — 99211 OFF/OP EST MAY X REQ PHY/QHP: CPT | Performed by: ORTHOPAEDIC SURGERY

## 2024-12-03 NOTE — LETTER
[facility]  [address] [place] [address] [place] [id number]  PHONE: [phone]  FAX: [phone]      December 3, 2024    Patient: Conrado Parker   YOB: 1960   Date of Visit: 12/3/2024       To Whom It May Concern,    Conrado Parker was seen in my clinic on 12/3/2024, he has been advised he may return to work on 12/9/2024 with restrictions of driving only for 2 weeks. Starting 12/30/2024, he may return to work with no restrictions, as tolerated.    If you have any questions or concerns, please contact the office by phone or fax.  Phone: [phone] or Fax: [phone]        Sincerely,      Jaylen Tavarez MD

## 2024-12-04 NOTE — PROGRESS NOTES
History of present illness    64-year-old male follow-up left total hip replacement surgery 10/25/2024 states doing well he is ambulating without assist device states he uses a cane sometimes most of the time he does not denies any fevers or chills denies any numbness or tingling.      Past medical , Surgical, Family and social history reviewed.      Physical exam  General: No acute distress and breathing comfortably.  Patient is pleasant and cooperative with the examination.    Extremity  Incision well-healed no sign infection good range of motion neurologically intact distally brisk capillary fill compartments soft calf is nontender    Diagnostics      XR hip left with pelvis when performed 2 or 3 views    Result Date: 11/12/2024  Interpreted By:  Marya Tavarez, STUDY: XR HIP LEFT WITH PELVIS WHEN PERFORMED 2 OR 3 VIEWS; 11/12/2024 8:32 am   INDICATION: Signs/Symptoms:pain.   ACCESSION NUMBER(S): [id number]   ORDERING CLINICIAN: MARYA TAVAREZ   FINDINGS: Two views show patient status post total hip replacement in good alignment.  No signs of fracture dislocation or other bony abnormality       Signed by: Marya Tavarez 11/12/2024 8:45 AM Dictation workstation:   [id number]       Procedure  [ none]    Assessment  Healing total hip replacement left    Treatment plan  1.  Continue with his physical therapy continue work on range of motion strengthening continue to wean from the cane as tolerated.  He is requesting a note to return to work with restriction beginning on 12 9.  He will follow-up with me in 6 weeks.  New x-rays at that time.  2. [   ]  3. [   ]  4.  All of the patient's questions were answered.    No orders of the defined types were placed in this encounter.      This note was prepared using voice recognition software.  The details of this note are correct and have been reviewed, and corrected to the best of my ability.  Some grammatical areas may persist related to  the Dragon software    Jaylen Tavarez MD  Senior Attending Physician  [facility]  [facility] [place]    [phone]

## 2024-12-06 ENCOUNTER — APPOINTMENT (OUTPATIENT)
Dept: PHYSICAL THERAPY | Facility: CLINIC | Age: 64
End: 2024-12-06
Payer: COMMERCIAL

## 2024-12-13 ENCOUNTER — TREATMENT (OUTPATIENT)
Dept: PHYSICAL THERAPY | Facility: CLINIC | Age: 64
End: 2024-12-13
Payer: COMMERCIAL

## 2024-12-13 DIAGNOSIS — M16.12 UNILATERAL PRIMARY OSTEOARTHRITIS, LEFT HIP: Primary | ICD-10-CM

## 2024-12-13 PROCEDURE — 97110 THERAPEUTIC EXERCISES: CPT | Mod: GP,CQ

## 2024-12-13 ASSESSMENT — PAIN - FUNCTIONAL ASSESSMENT: PAIN_FUNCTIONAL_ASSESSMENT: 0-10

## 2024-12-13 NOTE — PROGRESS NOTES
"Physical Therapy Treatment    Patient Name: Conrado Parker  MRN: [id number]  Today's Date: 12/13/2024  Time Calculation  Start Time: 0910  Stop Time: 0941  Time Calculation (min): 31 min  PT Therapeutic Procedures Time Entry  Therapeutic Exercise Time Entry: 30       Current Problem  1. Unilateral primary osteoarthritis, left hip            Subjective   General   Pt stating no issues L hip today.   Insurance   Visit number: 4/8  Approved number of visits: BMN   [employer] MEDICARE BMN PT OT COPAY 0 DED 0  COVERAGE 80 OOP [id number]) NO AUTH REQ  Precautions     Pain  Pain Assessment: 0-10    Objective   Treatments:  Rashi 8 min  Squats 3x10  HR/TR 1/2 roll 3x10  Standing 3 way hip GTB 2x10  Step ups F/L 10in 3x10  Retro step ups 6in 3x10  Heel taps 6in 3x10  SLS blk foam pad 20\" x5    Assessment   Pt ambulates into clinic IND with minimal antalgia. Showing marked improvement functional strength and mobility. Increased step height for all ex's and added retro step ups for quad strength and RTW style activity. Overall, no issues throughout treatment without pain today. Reviewed HEP.   Plan:  Cont to progress strength and endurance     OP EDUCATION:       Goals:     "

## 2024-12-20 ENCOUNTER — TREATMENT (OUTPATIENT)
Dept: PHYSICAL THERAPY | Facility: CLINIC | Age: 64
End: 2024-12-20
Payer: COMMERCIAL

## 2024-12-20 DIAGNOSIS — M16.12 UNILATERAL PRIMARY OSTEOARTHRITIS, LEFT HIP: ICD-10-CM

## 2024-12-20 PROCEDURE — 97535 SELF CARE MNGMENT TRAINING: CPT | Mod: GP

## 2024-12-20 NOTE — PROGRESS NOTES
Physical Therapy Re-Evaluation and Discharge     Patient Name: Conrado Parker  MRN: [id number]  Time Calculation  Start Time: 0915  Stop Time: 0930  Time Calculation (min): 15 min     PT Therapeutic Procedures Time Entry  Self-Care/Home Mgmt Training: 15                   Current Problem  1. Unilateral primary osteoarthritis, left hip  Follow Up In Physical Therapy          Insurance   Visit number: 5/8  Approved number of visits: BMN   [employer] MEDICARE BMN PT OT COPAY 0 DED 0  COVERAGE 80 OOP [id number]) NO AUTH REQ      Subjective   General:  Pt presents today for a treatment, but feels confident to be done with PT at this point s/p L BRIDGET posterior approach on 10/25/24 with Dr. Tavarez. 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     Precautions:  PL hip precautions     Pain:  0/10  Reviewed medical screening form with pt and medical screening assessed    Imaging:     Objective   Hip Musculoskeletal Exam  Gait    Antalgic: none     Assistive device: none      Inspection    Left      Incision: clean, dry and erythema      Incisional drainage: none     Palpation    Left      Tenderness: present (around incision)     Range of Motion    Right      Right hip range of motion is within functional limits.     Left      Active ROM: no pain.        Passive ROM: no pain.        Active flexion: 110.       Left hip active internal rotation: NT.       Active external rotation: WFL.       Passive external rotation: WFL .       Left hip active adduction: NT.       Active abduction: WFL.       Passive abduction: WFL.      Strength    Right      Right hip strength is normal.     Left      Extension: 5/5.       External rotation: 5/5.       Abduction: 5/5.     Strength additional comments: Flexion, IR, and adduction NT      Special Tests    Special tests additional comments: SLR: normal       Outcome Measures:  Other Measures  Lower Extremity  Funtional Score (LEFS): 70     Treatment:   Measures for re-eval     EDUCATION/HEP:  Continue HEP independently to maintain Sx     Assessment:  Pt presents today for a treatment, but feels confident to be done with PT at this point s/p L BRIDGET posterior approach on 10/25/24 with Dr. Tavarez. 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 L hip strength, ROM, and gait mechanics WNL and pt no longer stands to benefit from skilled PT d/t L hip goals met.     Clinical Presentation: Stable     Level of Complexity: Low     Goals:  Pt will be independent with advanced HEP. MET   Pt will improve LLE strength to at least 4+/5 in order to facilitate improved stability with all functional mobility. MET   Pt will demo full and symmetrical hip ROM to normalize mechanics with all functional mobility. MET   Pt will demo L LE SLS >/=10 sec without UE assist on compliant surface for functional carryover into dynamic balance. MET   Pt will negotiate flight of stairs mod I reciprocally without increased hip pain. MET   Pt will ambulate community distances independent over varying surfaces/inclines without increased L hip pain. MET   Pt will improve LEFS score by at least 9 points (MCID) to indicate significant improvement in functional abilities.     Plan  Discharge d/t L hip goals met     Skilled therapeutic intervention to address the above mentioned physical and functional impairments and limitations including, but not limited to: patient education, therapeutic exercise, therapeutic activity, manual therapy, body mechanics training, dry needling, blood flow restriction training, instrumented soft tissue mobilization, manual soft tissue mobilization, gait retraining, biofeedback, cryotherapy, electrical stimulation, home program development, hot pack, taping, neuromuscular re-education, self-care/home management, and vasopneumatic compression.

## 2024-12-27 ENCOUNTER — APPOINTMENT (OUTPATIENT)
Dept: PHYSICAL THERAPY | Facility: CLINIC | Age: 64
End: 2024-12-27
Payer: COMMERCIAL

## 2025-01-03 ENCOUNTER — APPOINTMENT (OUTPATIENT)
Dept: PHYSICAL THERAPY | Facility: CLINIC | Age: 65
End: 2025-01-03
Payer: COMMERCIAL

## 2025-01-14 ENCOUNTER — OFFICE VISIT (OUTPATIENT)
Dept: ORTHOPEDIC SURGERY | Facility: CLINIC | Age: 65
End: 2025-01-14
Payer: COMMERCIAL

## 2025-01-14 ENCOUNTER — HOSPITAL ENCOUNTER (OUTPATIENT)
Dept: RADIOLOGY | Facility: CLINIC | Age: 65
Discharge: HOME | End: 2025-01-14
Payer: COMMERCIAL

## 2025-01-14 DIAGNOSIS — Z96.642 AFTERCARE FOLLOWING LEFT HIP JOINT REPLACEMENT SURGERY: Primary | ICD-10-CM

## 2025-01-14 DIAGNOSIS — Z47.1 AFTERCARE FOLLOWING LEFT HIP JOINT REPLACEMENT SURGERY: ICD-10-CM

## 2025-01-14 DIAGNOSIS — Z47.1 AFTERCARE FOLLOWING LEFT HIP JOINT REPLACEMENT SURGERY: Primary | ICD-10-CM

## 2025-01-14 DIAGNOSIS — Z96.642 AFTERCARE FOLLOWING LEFT HIP JOINT REPLACEMENT SURGERY: ICD-10-CM

## 2025-01-14 PROCEDURE — 99211 OFF/OP EST MAY X REQ PHY/QHP: CPT | Performed by: ORTHOPAEDIC SURGERY

## 2025-01-14 PROCEDURE — 73502 X-RAY EXAM HIP UNI 2-3 VIEWS: CPT | Mod: LEFT SIDE | Performed by: ORTHOPAEDIC SURGERY

## 2025-01-14 PROCEDURE — 73502 X-RAY EXAM HIP UNI 2-3 VIEWS: CPT | Mod: LT

## 2025-01-14 RX ORDER — METHYLPREDNISOLONE 4 MG/1
TABLET ORAL
Qty: 21 TABLET | Refills: 0 | Status: SHIPPED | OUTPATIENT
Start: 2025-01-14

## 2025-01-14 NOTE — PROGRESS NOTES
History of present illness    64-year-old male follow-up left total hip replacement from October 25.  Patient states that his hip pain is gone away completely but he starting to have some thigh pain he states if he walks on the treadmill with his hands on the rails he does not have any pain whatsoever but if he just walks down to the mailbox without support he has pain in his thigh seems to be about the same not getting better not getting worse has been discharged from physical therapy he is not have any back pain or discomfort.      Past medical , Surgical, Family and social history reviewed.      Physical exam  General: No acute distress and breathing comfortably.  Patient is pleasant and cooperative with the examination.    Extremity  Left hip has well-healed incision no sign infection there is no tenderness palpation neurologically intact straight leg is testing is negative good muscle strength distally brisk cap refill see dictated x-ray report    Diagnostics      No results found.     Procedure  [ none]    Assessment  Status post left total hip replacement    Treatment plan  1.  Reassured him everything structurally looks okay as far as the hip replacement goes I think is just some tendinitis due to his altered gait mechanism I have recommended a Medrol Dosepak which sent to pharmacy he is leaving for a trip in a month so he is can see me back in about 3 weeks repeat evaluation without x-ray at that time.  2. [   ]  3. [   ]  4.  All of the patient's questions were answered.    Orders Placed This Encounter    XR hip left with pelvis when performed 2 or 3 views       This note was prepared using voice recognition software.  The details of this note are correct and have been reviewed, and corrected to the best of my ability.  Some grammatical areas may persist related to the Dragon software    Jaylen Tavarez MD  Senior Attending Physician  [facility]  [place]    [phone]

## 2025-01-16 ENCOUNTER — APPOINTMENT (OUTPATIENT)
Dept: PRIMARY CARE | Facility: CLINIC | Age: 65
End: 2025-01-16
Payer: COMMERCIAL

## 2025-01-16 VITALS
OXYGEN SATURATION: 95 % | SYSTOLIC BLOOD PRESSURE: 130 MMHG | HEIGHT: 72 IN | WEIGHT: 284 LBS | BODY MASS INDEX: 38.47 KG/M2 | TEMPERATURE: 97.1 F | DIASTOLIC BLOOD PRESSURE: 64 MMHG | HEART RATE: 94 BPM | RESPIRATION RATE: 18 BRPM

## 2025-01-16 DIAGNOSIS — Z12.11 SCREENING FOR COLON CANCER: ICD-10-CM

## 2025-01-16 DIAGNOSIS — M25.552 LEFT HIP PAIN: ICD-10-CM

## 2025-01-16 DIAGNOSIS — Z12.5 ENCOUNTER FOR SCREENING FOR MALIGNANT NEOPLASM OF PROSTATE: ICD-10-CM

## 2025-01-16 DIAGNOSIS — K60.2 ANAL FISSURE: ICD-10-CM

## 2025-01-16 DIAGNOSIS — E66.812 CLASS 2 SEVERE OBESITY DUE TO EXCESS CALORIES WITH SERIOUS COMORBIDITY AND BODY MASS INDEX (BMI) OF 38.0 TO 38.9 IN ADULT: ICD-10-CM

## 2025-01-16 DIAGNOSIS — E66.01 CLASS 2 SEVERE OBESITY DUE TO EXCESS CALORIES WITH SERIOUS COMORBIDITY AND BODY MASS INDEX (BMI) OF 38.0 TO 38.9 IN ADULT: ICD-10-CM

## 2025-01-16 DIAGNOSIS — Z00.00 HEALTHCARE MAINTENANCE: Primary | ICD-10-CM

## 2025-01-16 PROCEDURE — 1036F TOBACCO NON-USER: CPT | Performed by: FAMILY MEDICINE

## 2025-01-16 PROCEDURE — 3008F BODY MASS INDEX DOCD: CPT | Performed by: FAMILY MEDICINE

## 2025-01-16 PROCEDURE — 99396 PREV VISIT EST AGE 40-64: CPT | Performed by: FAMILY MEDICINE

## 2025-01-16 RX ORDER — HYDROCORTISONE 25 MG/G
CREAM TOPICAL 4 TIMES DAILY PRN
Qty: 30 G | Refills: 0 | Status: CANCELLED | OUTPATIENT
Start: 2025-01-16 | End: 2026-01-16

## 2025-01-16 RX ORDER — CELECOXIB 200 MG/1
200 CAPSULE ORAL DAILY
Qty: 30 CAPSULE | Refills: 1 | Status: SHIPPED | OUTPATIENT
Start: 2025-01-16

## 2025-01-16 RX ORDER — TIZANIDINE 4 MG/1
4 TABLET ORAL NIGHTLY PRN
Qty: 30 TABLET | Refills: 0 | Status: SHIPPED | OUTPATIENT
Start: 2025-01-16

## 2025-01-16 RX ORDER — OXYCODONE AND ACETAMINOPHEN 5; 325 MG/1; MG/1
1 TABLET ORAL EVERY 6 HOURS PRN
Qty: 20 TABLET | Refills: 0 | Status: SHIPPED | OUTPATIENT
Start: 2025-01-16

## 2025-01-16 RX ORDER — HYDROCODONE BITARTRATE AND ACETAMINOPHEN 5; 325 MG/1; MG/1
1 TABLET ORAL EVERY 6 HOURS PRN
Qty: 20 TABLET | Refills: 0 | Status: CANCELLED | OUTPATIENT
Start: 2025-01-16

## 2025-01-16 ASSESSMENT — ENCOUNTER SYMPTOMS
DYSURIA: 0
ADENOPATHY: 0
CHILLS: 0
SHORTNESS OF BREATH: 0
DIZZINESS: 0
WHEEZING: 0
DIARRHEA: 0
PALPITATIONS: 0
SORE THROAT: 0
TREMORS: 0
HIP PAIN: 1
CONSTIPATION: 0
HEADACHES: 0
HEMATURIA: 0
VOMITING: 0
TINGLING: 0
RHINORRHEA: 0
ARTHRALGIAS: 1
COUGH: 0
FREQUENCY: 0
FEVER: 0
NUMBNESS: 0
BRUISES/BLEEDS EASILY: 0
MUSCLE WEAKNESS: 1
ABDOMINAL PAIN: 0

## 2025-01-16 ASSESSMENT — PATIENT HEALTH QUESTIONNAIRE - PHQ9
1. LITTLE INTEREST OR PLEASURE IN DOING THINGS: NOT AT ALL
2. FEELING DOWN, DEPRESSED OR HOPELESS: NOT AT ALL
SUM OF ALL RESPONSES TO PHQ9 QUESTIONS 1 AND 2: 0

## 2025-01-16 NOTE — ASSESSMENT & PLAN NOTE
We will have him continue taking the Stool Softeners and do a Sitz bath for relief.  Follow-up if persistent or worsening symptoms otherwise when necessary.

## 2025-01-16 NOTE — PROGRESS NOTES
Subjective   Patient ID: Umer Parker is a 64 y.o. male who presents for Annual Exam, Rectal Pain, and Hip Pain.    Patient presents today for annual physical exam.    Patient complains of rectal pain. Onset was several months ago and began after surgery. Associated symptoms include: pain with bowel movements, and bleeding a small amount. Symptoms have been gradually improving. He has been taking OTC stool softeners daily.    Hip Pain   The pain is present in the left hip and left thigh. The quality of the pain is described as aching. Associated symptoms include muscle weakness. Pertinent negatives include no numbness or tingling. The symptoms are aggravated by weight bearing.      Review of Systems   Constitutional:  Negative for chills and fever.   HENT:  Negative for congestion, ear pain, nosebleeds, rhinorrhea and sore throat.    Respiratory:  Negative for cough, shortness of breath and wheezing.    Cardiovascular:  Negative for chest pain, palpitations and leg swelling.   Gastrointestinal:  Negative for abdominal pain, constipation, diarrhea and vomiting.   Genitourinary:  Negative for dysuria, frequency and hematuria.   Musculoskeletal:  Positive for arthralgias and gait problem.   Neurological:  Negative for dizziness, tingling, tremors, numbness and headaches.   Hematological:  Negative for adenopathy. Does not bruise/bleed easily.       Objective   /64   Pulse 94   Temp 36.2 °C (97.1 °F)   Resp 18   Ht 1.829 m (6')   Wt 129 kg (284 lb)   SpO2 95%   BMI 38.52 kg/m²     Physical Exam  Constitutional:       General: He is not in acute distress.     Appearance: Normal appearance.   HENT:      Head: Normocephalic and atraumatic.      Mouth/Throat:      Mouth: Mucous membranes are moist.      Pharynx: Oropharynx is clear. No oropharyngeal exudate or posterior oropharyngeal erythema.   Eyes:      General: No scleral icterus.     Extraocular Movements: Extraocular movements intact.      Pupils: Pupils  are equal, round, and reactive to light.   Cardiovascular:      Rate and Rhythm: Normal rate and regular rhythm.      Pulses: Normal pulses.      Heart sounds: No murmur heard.     No friction rub. No gallop.   Pulmonary:      Effort: Pulmonary effort is normal.      Breath sounds: No wheezing, rhonchi or rales.   Abdominal:      General: There is no distension.      Palpations: There is no mass.      Tenderness: There is no abdominal tenderness. There is no right CVA tenderness, left CVA tenderness, guarding or rebound.   Skin:     General: Skin is warm.      Coloration: Skin is not jaundiced or pale.      Findings: No erythema or rash.   Neurological:      General: No focal deficit present.      Mental Status: He is alert and oriented to person, place, and time.      Cranial Nerves: No cranial nerve deficit.      Sensory: No sensory deficit.      Coordination: Coordination normal.      Gait: Gait normal.         Assessment/Plan   Problem List Items Addressed This Visit       Anal fissure     We will have him continue taking the Stool Softeners and do a Sitz bath for relief.  Follow-up if persistent or worsening symptoms otherwise when necessary.         Relevant Orders    Referral to General Surgery    Class 2 severe obesity due to excess calories with serious comorbidity and body mass index (BMI) of 38.0 to 38.9 in adult     Continue decrease calorie diet and not more than 1500 calorie per day diet and low-fat diet.  Continue with regular exercise program.  We advised exercise at least 5 days a week for at least 45 minutes and also a minimum of 10,000 steps a day.  The detrimental effects of obesity on health were discussed.         Healthcare maintenance - Primary     Recommend low-cholesterol diet, low-fat diet and low-salt diet.  The need for lifelong dietary compliance in order to reduce cardiac risk is recommended.  We will also recommend regular exercise program to improve lipid balance and overall  health.  Recommend decreasing fat and cholesterol in diet, increasing aerobic exercise with a goal of 4 or more days per week         Relevant Orders    Comprehensive Metabolic Panel    CBC and Auto Differential    Lipid Panel    Left hip pain     Natural history and expected course discussed. Questions answered.  NSAIDs per medication orders.  Follow-up if persistent or worsening symptoms otherwise when necessary.         Relevant Medications    celecoxib (CeleBREX) 200 mg capsule    oxyCODONE-acetaminophen (Percocet) 5-325 mg tablet    tiZANidine (Zanaflex) 4 mg tablet     Other Visit Diagnoses       Encounter for screening for malignant neoplasm of prostate        Relevant Orders    Prostate Specific Antigen, Screen    Screening for colon cancer        Relevant Orders    Referral to General Surgery          Scribe Attestation  By signing my name below, I, Seth Lehman   attest that this documentation has been prepared under the direction and in the presence of Win Overton MD.

## 2025-01-16 NOTE — PATIENT INSTRUCTIONS
BMI was above normal measurement. Current weight: 129 kg (284 lb)  Weight change since last visit (-) denotes wt loss 4.24 lbs   Weight loss needed to achieve BMI 25: 100.1 Lbs  Weight loss needed to achieve BMI 30: 63.3 Lbs  Advised to Increase physical activity.

## 2025-01-16 NOTE — ASSESSMENT & PLAN NOTE
Natural history and expected course discussed. Questions answered.  NSAIDs per medication orders.  Follow-up if persistent or worsening symptoms otherwise when necessary.

## 2025-01-17 ENCOUNTER — TELEPHONE (OUTPATIENT)
Dept: PRIMARY CARE | Facility: CLINIC | Age: 65
End: 2025-01-17
Payer: COMMERCIAL

## 2025-01-17 NOTE — TELEPHONE ENCOUNTER
Prior Authorization for oxyCODONE-acetaminophen (Percocet) 5-325 mg tablet  has been APPROVED.    Approval valid through 02.16.2025  Approval valid 02.16.2025

## 2025-01-27 ENCOUNTER — APPOINTMENT (OUTPATIENT)
Dept: SURGERY | Facility: CLINIC | Age: 65
End: 2025-01-27
Payer: COMMERCIAL

## 2025-02-04 ENCOUNTER — APPOINTMENT (OUTPATIENT)
Dept: ORTHOPEDIC SURGERY | Facility: CLINIC | Age: 65
End: 2025-02-04
Payer: COMMERCIAL

## 2025-02-07 ENCOUNTER — APPOINTMENT (OUTPATIENT)
Dept: CARDIOLOGY | Facility: HOSPITAL | Age: 65
End: 2025-02-07
Payer: COMMERCIAL

## 2025-02-07 ENCOUNTER — APPOINTMENT (OUTPATIENT)
Dept: GASTROENTEROLOGY | Facility: HOSPITAL | Age: 65
End: 2025-02-07
Payer: COMMERCIAL

## 2025-02-07 ENCOUNTER — HOSPITAL ENCOUNTER (EMERGENCY)
Facility: HOSPITAL | Age: 65
Discharge: HOME | End: 2025-02-07
Attending: EMERGENCY MEDICINE
Payer: COMMERCIAL

## 2025-02-07 ENCOUNTER — ANESTHESIA EVENT (OUTPATIENT)
Dept: GASTROENTEROLOGY | Facility: HOSPITAL | Age: 65
End: 2025-02-07
Payer: COMMERCIAL

## 2025-02-07 ENCOUNTER — ANESTHESIA (OUTPATIENT)
Dept: GASTROENTEROLOGY | Facility: HOSPITAL | Age: 65
End: 2025-02-07
Payer: COMMERCIAL

## 2025-02-07 ENCOUNTER — OFFICE VISIT (OUTPATIENT)
Dept: ORTHOPEDIC SURGERY | Facility: CLINIC | Age: 65
End: 2025-02-07
Payer: COMMERCIAL

## 2025-02-07 ENCOUNTER — APPOINTMENT (OUTPATIENT)
Dept: RADIOLOGY | Facility: HOSPITAL | Age: 65
End: 2025-02-07
Payer: COMMERCIAL

## 2025-02-07 VITALS
HEIGHT: 72 IN | OXYGEN SATURATION: 94 % | SYSTOLIC BLOOD PRESSURE: 136 MMHG | RESPIRATION RATE: 18 BRPM | TEMPERATURE: 97.3 F | HEART RATE: 74 BPM | WEIGHT: 281 LBS | DIASTOLIC BLOOD PRESSURE: 79 MMHG | BODY MASS INDEX: 38.06 KG/M2

## 2025-02-07 VITALS — HEIGHT: 72 IN | WEIGHT: 281 LBS | BODY MASS INDEX: 38.06 KG/M2

## 2025-02-07 DIAGNOSIS — K29.00 ACUTE GASTRITIS WITHOUT HEMORRHAGE, UNSPECIFIED GASTRITIS TYPE: ICD-10-CM
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
## 2025-02-07 DIAGNOSIS — Z96.642 STATUS POST LEFT HIP REPLACEMENT: ICD-10-CM

## 2025-02-07 DIAGNOSIS — Z47.1 AFTERCARE FOLLOWING LEFT HIP JOINT REPLACEMENT SURGERY: ICD-10-CM

## 2025-02-07 DIAGNOSIS — T18.128A FOOD IMPACTION OF ESOPHAGUS, INITIAL ENCOUNTER: Primary | ICD-10-CM

## 2025-02-07 DIAGNOSIS — Z96.642 AFTERCARE FOLLOWING LEFT HIP JOINT REPLACEMENT SURGERY: ICD-10-CM

## 2025-02-07 LAB
ANION GAP SERPL CALC-SCNC: 12 MMOL/L (ref 10–20)
ATRIAL RATE: 75 BPM
BASOPHILS # BLD AUTO: 0.04 X10*3/UL (ref 0–0.1)
BASOPHILS NFR BLD AUTO: 0.4 %
BUN SERPL-MCNC: 14 MG/DL (ref 6–23)
CALCIUM SERPL-MCNC: 9.1 MG/DL (ref 8.6–10.3)
CARDIAC TROPONIN I PNL SERPL HS: 4 NG/L (ref 0–20)
CHLORIDE SERPL-SCNC: 106 MMOL/L (ref 98–107)
CO2 SERPL-SCNC: 27 MMOL/L (ref 21–32)
CREAT SERPL-MCNC: 1 MG/DL (ref 0.5–1.3)
EGFRCR SERPLBLD CKD-EPI 2021: 85 ML/MIN/1.73M*2
EOSINOPHIL # BLD AUTO: 0.19 X10*3/UL (ref 0–0.7)
EOSINOPHIL NFR BLD AUTO: 2 %
ERYTHROCYTE [DISTWIDTH] IN BLOOD BY AUTOMATED COUNT: 14 % (ref 11.5–14.5)
GLUCOSE SERPL-MCNC: 95 MG/DL (ref 74–99)
HCT VFR BLD AUTO: 51.4 % (ref 41–52)
HGB BLD-MCNC: 16.9 G/DL (ref 13.5–17.5)
IMM GRANULOCYTES # BLD AUTO: 0.02 X10*3/UL (ref 0–0.7)
IMM GRANULOCYTES NFR BLD AUTO: 0.2 % (ref 0–0.9)
INR PPP: 1.1 (ref 0.9–1.1)
LYMPHOCYTES # BLD AUTO: 3.54 X10*3/UL (ref 1.2–4.8)
LYMPHOCYTES NFR BLD AUTO: 36.6 %
MCH RBC QN AUTO: 29 PG (ref 26–34)
MCHC RBC AUTO-ENTMCNC: 32.9 G/DL (ref 32–36)
MCV RBC AUTO: 88 FL (ref 80–100)
MONOCYTES # BLD AUTO: 0.48 X10*3/UL (ref 0.1–1)
MONOCYTES NFR BLD AUTO: 5 %
NEUTROPHILS # BLD AUTO: 5.41 X10*3/UL (ref 1.2–7.7)
NEUTROPHILS NFR BLD AUTO: 55.8 %
NRBC BLD-RTO: 0 /100 WBCS (ref 0–0)
P AXIS: 50 DEGREES
P OFFSET: 187 MS
P ONSET: 136 MS
PLATELET # BLD AUTO: 257 X10*3/UL (ref 150–450)
POTASSIUM SERPL-SCNC: 4 MMOL/L (ref 3.5–5.3)
PR INTERVAL: 164 MS
PROTHROMBIN TIME: 12.5 SECONDS (ref 9.8–12.8)
Q ONSET: 218 MS
QRS COUNT: 12 BEATS
QRS DURATION: 90 MS
QT INTERVAL: 388 MS
QTC CALCULATION(BAZETT): 433 MS
QTC FREDERICIA: 417 MS
R AXIS: 33 DEGREES
RBC # BLD AUTO: 5.83 X10*6/UL (ref 4.5–5.9)
SODIUM SERPL-SCNC: 141 MMOL/L (ref 136–145)
T AXIS: -5 DEGREES
T OFFSET: 412 MS
VENTRICULAR RATE: 75 BPM
WBC # BLD AUTO: 9.7 X10*3/UL (ref 4.4–11.3)

## 2025-02-07 PROCEDURE — 43239 EGD BIOPSY SINGLE/MULTIPLE: CPT | Performed by: STUDENT IN AN ORGANIZED HEALTH CARE EDUCATION/TRAINING PROGRAM

## 2025-02-07 PROCEDURE — 7100000001 HC RECOVERY ROOM TIME - INITIAL BASE CHARGE

## 2025-02-07 PROCEDURE — 1036F TOBACCO NON-USER: CPT | Performed by: PHYSICIAN ASSISTANT

## 2025-02-07 PROCEDURE — 96361 HYDRATE IV INFUSION ADD-ON: CPT | Mod: 59

## 2025-02-07 PROCEDURE — 2500000004 HC RX 250 GENERAL PHARMACY W/ HCPCS (ALT 636 FOR OP/ED): Performed by: NURSE ANESTHETIST, CERTIFIED REGISTERED

## 2025-02-07 PROCEDURE — 85025 COMPLETE CBC W/AUTO DIFF WBC: CPT | Performed by: NURSE PRACTITIONER

## 2025-02-07 PROCEDURE — 88305 TISSUE EXAM BY PATHOLOGIST: CPT | Mod: TC,ELYLAB | Performed by: STUDENT IN AN ORGANIZED HEALTH CARE EDUCATION/TRAINING PROGRAM

## 2025-02-07 PROCEDURE — 96374 THER/PROPH/DIAG INJ IV PUSH: CPT | Mod: 59

## 2025-02-07 PROCEDURE — 36415 COLL VENOUS BLD VENIPUNCTURE: CPT | Performed by: NURSE PRACTITIONER

## 2025-02-07 PROCEDURE — 43247 EGD REMOVE FOREIGN BODY: CPT | Performed by: STUDENT IN AN ORGANIZED HEALTH CARE EDUCATION/TRAINING PROGRAM

## 2025-02-07 PROCEDURE — 3700000002 HC GENERAL ANESTHESIA TIME - EACH INCREMENTAL 1 MINUTE

## 2025-02-07 PROCEDURE — 99222 1ST HOSP IP/OBS MODERATE 55: CPT | Performed by: NURSE PRACTITIONER

## 2025-02-07 PROCEDURE — 3008F BODY MASS INDEX DOCD: CPT | Performed by: PHYSICIAN ASSISTANT

## 2025-02-07 PROCEDURE — 71045 X-RAY EXAM CHEST 1 VIEW: CPT | Performed by: STUDENT IN AN ORGANIZED HEALTH CARE EDUCATION/TRAINING PROGRAM

## 2025-02-07 PROCEDURE — 80048 BASIC METABOLIC PNL TOTAL CA: CPT | Performed by: NURSE PRACTITIONER

## 2025-02-07 PROCEDURE — 71045 X-RAY EXAM CHEST 1 VIEW: CPT

## 2025-02-07 PROCEDURE — 99213 OFFICE O/P EST LOW 20 MIN: CPT | Performed by: PHYSICIAN ASSISTANT

## 2025-02-07 PROCEDURE — 2500000004 HC RX 250 GENERAL PHARMACY W/ HCPCS (ALT 636 FOR OP/ED): Mod: JZ | Performed by: NURSE PRACTITIONER

## 2025-02-07 PROCEDURE — 84484 ASSAY OF TROPONIN QUANT: CPT | Performed by: NURSE PRACTITIONER

## 2025-02-07 PROCEDURE — 2720000007 HC OR 272 NO HCPCS

## 2025-02-07 PROCEDURE — 2500000005 HC RX 250 GENERAL PHARMACY W/O HCPCS: Performed by: NURSE ANESTHETIST, CERTIFIED REGISTERED

## 2025-02-07 PROCEDURE — 7100000009 HC PHASE TWO TIME - INITIAL BASE CHARGE

## 2025-02-07 PROCEDURE — 96375 TX/PRO/DX INJ NEW DRUG ADDON: CPT | Mod: 59

## 2025-02-07 PROCEDURE — 99285 EMERGENCY DEPT VISIT HI MDM: CPT | Mod: 25 | Performed by: EMERGENCY MEDICINE

## 2025-02-07 PROCEDURE — 85610 PROTHROMBIN TIME: CPT | Performed by: NURSE PRACTITIONER

## 2025-02-07 PROCEDURE — 3700000001 HC GENERAL ANESTHESIA TIME - INITIAL BASE CHARGE

## 2025-02-07 PROCEDURE — 93005 ELECTROCARDIOGRAM TRACING: CPT

## 2025-02-07 PROCEDURE — 7100000010 HC PHASE TWO TIME - EACH INCREMENTAL 1 MINUTE

## 2025-02-07 PROCEDURE — 7100000002 HC RECOVERY ROOM TIME - EACH INCREMENTAL 1 MINUTE

## 2025-02-07 RX ORDER — OXYCODONE HYDROCHLORIDE 5 MG/1
5 TABLET ORAL EVERY 4 HOURS PRN
OUTPATIENT
Start: 2025-02-07

## 2025-02-07 RX ORDER — LIDOCAINE HYDROCHLORIDE 20 MG/ML
INJECTION, SOLUTION INFILTRATION; PERINEURAL AS NEEDED
Status: DISCONTINUED | OUTPATIENT
Start: 2025-02-07 | End: 2025-02-07

## 2025-02-07 RX ORDER — ONDANSETRON HYDROCHLORIDE 2 MG/ML
INJECTION, SOLUTION INTRAVENOUS AS NEEDED
Status: DISCONTINUED | OUTPATIENT
Start: 2025-02-07 | End: 2025-02-07

## 2025-02-07 RX ORDER — FENTANYL CITRATE 50 UG/ML
25 INJECTION, SOLUTION INTRAMUSCULAR; INTRAVENOUS EVERY 5 MIN PRN
Status: DISCONTINUED | OUTPATIENT
Start: 2025-02-07 | End: 2025-02-07 | Stop reason: HOSPADM

## 2025-02-07 RX ORDER — ROCURONIUM BROMIDE 10 MG/ML
INJECTION, SOLUTION INTRAVENOUS AS NEEDED
Status: DISCONTINUED | OUTPATIENT
Start: 2025-02-07 | End: 2025-02-07

## 2025-02-07 RX ORDER — ACETAMINOPHEN 325 MG/1
650 TABLET ORAL EVERY 4 HOURS PRN
OUTPATIENT
Start: 2025-02-07

## 2025-02-07 RX ORDER — PROPOFOL 10 MG/ML
INJECTION, EMULSION INTRAVENOUS AS NEEDED
Status: DISCONTINUED | OUTPATIENT
Start: 2025-02-07 | End: 2025-02-07

## 2025-02-07 RX ORDER — OMEPRAZOLE 40 MG/1
40 CAPSULE, DELAYED RELEASE ORAL DAILY
Qty: 30 CAPSULE | Refills: 1 | Status: SHIPPED | OUTPATIENT
Start: 2025-02-07

## 2025-02-07 RX ORDER — SUCCINYLCHOLINE CHLORIDE 100 MG/5ML
SYRINGE (ML) INTRAVENOUS AS NEEDED
Status: DISCONTINUED | OUTPATIENT
Start: 2025-02-07 | End: 2025-02-07

## 2025-02-07 RX ORDER — MEPERIDINE HYDROCHLORIDE 25 MG/ML
12.5 INJECTION INTRAMUSCULAR; INTRAVENOUS; SUBCUTANEOUS EVERY 10 MIN PRN
Status: DISCONTINUED | OUTPATIENT
Start: 2025-02-07 | End: 2025-02-07 | Stop reason: HOSPADM

## 2025-02-07 RX ORDER — LORAZEPAM 2 MG/ML
1 INJECTION INTRAMUSCULAR ONCE
Status: COMPLETED | OUTPATIENT
Start: 2025-02-07 | End: 2025-02-07

## 2025-02-07 RX ORDER — SODIUM CHLORIDE, SODIUM LACTATE, POTASSIUM CHLORIDE, CALCIUM CHLORIDE 600; 310; 30; 20 MG/100ML; MG/100ML; MG/100ML; MG/100ML
100 INJECTION, SOLUTION INTRAVENOUS CONTINUOUS
Status: DISCONTINUED | OUTPATIENT
Start: 2025-02-07 | End: 2025-02-07 | Stop reason: HOSPADM

## 2025-02-07 RX ORDER — METHYLPREDNISOLONE 4 MG/1
TABLET ORAL
Qty: 21 TABLET | Refills: 0 | Status: SHIPPED | OUTPATIENT
Start: 2025-02-07

## 2025-02-07 RX ORDER — MIDAZOLAM HYDROCHLORIDE 1 MG/ML
INJECTION, SOLUTION INTRAMUSCULAR; INTRAVENOUS AS NEEDED
Status: DISCONTINUED | OUTPATIENT
Start: 2025-02-07 | End: 2025-02-07

## 2025-02-07 RX ORDER — FENTANYL CITRATE 50 UG/ML
50 INJECTION, SOLUTION INTRAMUSCULAR; INTRAVENOUS EVERY 5 MIN PRN
Status: DISCONTINUED | OUTPATIENT
Start: 2025-02-07 | End: 2025-02-07 | Stop reason: HOSPADM

## 2025-02-07 RX ORDER — SODIUM CHLORIDE, SODIUM LACTATE, POTASSIUM CHLORIDE, CALCIUM CHLORIDE 600; 310; 30; 20 MG/100ML; MG/100ML; MG/100ML; MG/100ML
INJECTION, SOLUTION INTRAVENOUS CONTINUOUS PRN
Status: DISCONTINUED | OUTPATIENT
Start: 2025-02-07 | End: 2025-02-07

## 2025-02-07 RX ORDER — CYCLOBENZAPRINE HCL 10 MG
10 TABLET ORAL NIGHTLY PRN
Qty: 20 TABLET | Refills: 0 | Status: SHIPPED | OUTPATIENT
Start: 2025-02-07

## 2025-02-07 RX ORDER — OXYCODONE HYDROCHLORIDE 5 MG/1
10 TABLET ORAL EVERY 4 HOURS PRN
OUTPATIENT
Start: 2025-02-07

## 2025-02-07 RX ADMIN — DEXAMETHASONE SODIUM PHOSPHATE 8 MG: 4 INJECTION, SOLUTION INTRAMUSCULAR; INTRAVENOUS at 15:07

## 2025-02-07 RX ADMIN — SUGAMMADEX 200 MG: 100 INJECTION, SOLUTION INTRAVENOUS at 15:34

## 2025-02-07 RX ADMIN — PROPOFOL 200 MG: 10 INJECTION, EMULSION INTRAVENOUS at 15:07

## 2025-02-07 RX ADMIN — SODIUM CHLORIDE, POTASSIUM CHLORIDE, SODIUM LACTATE AND CALCIUM CHLORIDE: 600; 310; 30; 20 INJECTION, SOLUTION INTRAVENOUS at 15:02

## 2025-02-07 RX ADMIN — SODIUM CHLORIDE 1000 ML: 9 INJECTION, SOLUTION INTRAVENOUS at 10:51

## 2025-02-07 RX ADMIN — ONDANSETRON 4 MG: 2 INJECTION, SOLUTION INTRAMUSCULAR; INTRAVENOUS at 15:07

## 2025-02-07 RX ADMIN — ROCURONIUM BROMIDE 50 MG: 10 SOLUTION INTRAVENOUS at 15:07

## 2025-02-07 RX ADMIN — Medication 160 MG: at 15:07

## 2025-02-07 RX ADMIN — LORAZEPAM 1 MG: 2 INJECTION INTRAMUSCULAR; INTRAVENOUS at 10:46

## 2025-02-07 RX ADMIN — GLUCAGON 1 MG: KIT at 10:46

## 2025-02-07 RX ADMIN — MIDAZOLAM HYDROCHLORIDE 5 MG: 1 INJECTION, SOLUTION INTRAMUSCULAR; INTRAVENOUS at 15:07

## 2025-02-07 RX ADMIN — LIDOCAINE HYDROCHLORIDE 100 MG: 20 INJECTION, SOLUTION INFILTRATION; PERINEURAL at 15:07

## 2025-02-07 ASSESSMENT — LIFESTYLE VARIABLES
HAVE YOU EVER FELT YOU SHOULD CUT DOWN ON YOUR DRINKING: NO
EVER FELT BAD OR GUILTY ABOUT YOUR DRINKING: NO
TOTAL SCORE: 0
EVER HAD A DRINK FIRST THING IN THE MORNING TO STEADY YOUR NERVES TO GET RID OF A HANGOVER: NO
HAVE PEOPLE ANNOYED YOU BY CRITICIZING YOUR DRINKING: NO

## 2025-02-07 ASSESSMENT — COLUMBIA-SUICIDE SEVERITY RATING SCALE - C-SSRS
2. HAVE YOU ACTUALLY HAD ANY THOUGHTS OF KILLING YOURSELF?: NO
6. HAVE YOU EVER DONE ANYTHING, STARTED TO DO ANYTHING, OR PREPARED TO DO ANYTHING TO END YOUR LIFE?: NO
1. IN THE PAST MONTH, HAVE YOU WISHED YOU WERE DEAD OR WISHED YOU COULD GO TO SLEEP AND NOT WAKE UP?: NO

## 2025-02-07 ASSESSMENT — PAIN - FUNCTIONAL ASSESSMENT
PAIN_FUNCTIONAL_ASSESSMENT: 0-10

## 2025-02-07 ASSESSMENT — PAIN SCALES - GENERAL
PAINLEVEL_OUTOF10: 0 - NO PAIN
PAIN_LEVEL: 0
PAINLEVEL_OUTOF10: 0 - NO PAIN

## 2025-02-07 NOTE — PROGRESS NOTES
"  Subjective    Patient ID: Conrado Austin\"    Chief Complaint:   Chief Complaint   Patient presents with    Left Hip - Follow-up     THR 10.25.24     History of present illness    64-year-old gentleman presented clinic today for his 3-month postop visit status post left total hip replacement.  Overall left hip is doing well.  He was placed on a Medrol Dosepak at his last appointment due to some mild adductor tightness and discomfort.  He was having more pelvic radicular symptoms.  He states that the Medrol Dosepak helped but now that he has been off of it he still having pain in stiffness and tightness over this area.  He states that the left hip joint itself is doing great.  He has no pain on the posterior lateral left buttock region.  No numbness tingling present.  He is leaving for [place] in the next few weeks.  Still has mild limitations with range of motion including internal and external rotation.      Past medical , Surgical, Family and social history reviewed.      Physical exam  General: No acute distress and breathing comfortably.  Patient is pleasant and cooperative with the examination.    Extremity  Left hip is neurovascular intact.  Demonstrates good range of motion still slightly limited with rotation.  No groin pain.  Mild tightness over the adductor region.  Compartment soft.  Capillary refill within normal is distally.  Good strength left lower extremity.  No instability on exam    Diagnostics  [ none]    Procedure  [ none]    Assessment  Status post left total hip replacement    Treatment plan  1.  He was encouraged to continue with weightbearing activities tolerated.  2.  We discussed psoas stretching methods.  Prescription refill Medrol Dosepak and muscle relaxant sent to the pharmacy this is only as a rescue for [place].  3.  He will follow-up with us in 3 months with new x-rays left hip sooner if he has any problems  4.  All of the patient's questions were " answered.    No orders of the defined types were placed in this encounter.      This note was prepared using voice recognition software.  The details of this note are correct and have been reviewed, and corrected to the best of my ability.  Some grammatical areas may persist related to the Dragon software    Luis Diaz PA-C, [facility]  [facility]  Orthopedic [place]    [phone]

## 2025-02-07 NOTE — ANESTHESIA PROCEDURE NOTES
Airway  Date/Time: 2/7/2025 3:10 PM  Urgency: elective    Airway not difficult    Staffing  Performed: CRNA   Authorized by: Raymundo Sommer MD    Performed by: AUGUSTINE Art-AMINA  Patient location during procedure: OR    Indications and Patient Condition  Indications for airway management: anesthesia  Spontaneous Ventilation: absent  Sedation level: deep  Preoxygenated: yes  Patient position: sniffing  MILS maintained throughout  Mask difficulty assessment: 0 - not attempted  Planned trial extubation    Final Airway Details  Final airway type: endotracheal airway      Successful airway: ETT  Cuffed: yes   Successful intubation technique: video laryngoscopy  Facilitating devices/methods: intubating stylet  Endotracheal tube insertion site: oral  Blade size: #4  ETT size (mm): 7.5  Cormack-Lehane Classification: grade IIa - partial view of glottis  Placement verified by: chest auscultation and capnometry   Cuff volume (mL): 7  Measured from: lips  ETT to lips (cm): 24  Number of attempts at approach: 1  Ventilation between attempts: BVM  Number of other approaches attempted: 0

## 2025-02-07 NOTE — CONSULTS
Department of Internal Medicine  Gastroenterology  Consult note      Reason for Consult: Food impaction    Chief Complaint: Food stuck in his throat    History Obtained from: Medical staff and patient    History of Present Illness      The patient is a 64 y.o. male with signficant past medical/surgical history of left total hip replacement 3 months ago who presents for food impaction.    The patient reports eating dinner last night about 6 PM, rib cutlets, when he noticed it became stuck. Patient reports he still feels like the food is stuck, pointing to his chest.  Patient is able to manage secretions, but reports he is regurgitating up everything he tries to drink or eat since.  Patient reports he has had this happen in the past, but the food usually goes down on its own without intervention.  The patient attributes food getting stuck with taking out his dentures when he eats since it obscures the flavor of foods.  Therefore, he will not be able to chew his food as well leading to swallowing a bigger bite that he normally would while wearing the dentures.  No blood thinners at home.  No prior EGD in the past.  No significant difficulty breathing.  No abdominal pain.  Celebrex in medication list likely after hip surgery 3 months ago, but patient currently denies regular NSAID use.  Patient reports rare alcohol, reports minimal marijuana use.  Tobacco use, currently 1-2 cigars a day.      Spoke with DIONTE Zendejas in the ED. Patient given Ativan and glucagon in the ED with no improvement.    Allergies  Other    Current Medication  No current facility-administered medications for this encounter.    Current Outpatient Medications:     celecoxib (CeleBREX) 200 mg capsule, Take 1 capsule (200 mg) by mouth once daily., Disp: 30 capsule, Rfl: 1    cyclobenzaprine (Flexeril) 10 mg tablet, Take 1 tablet (10 mg) by mouth as needed at bedtime for muscle spasms., Disp: 20 tablet, Rfl: 0    methylPREDNISolone (Medrol Dospak) 4 mg  tablets, Use as directed by package instructions, Disp: 21 tablet, Rfl: 0    naloxone (Narcan) 4 mg/0.1 mL nasal spray, Instill 1 spray intranasally for opioid overdose; repeat in 5 minutes if no response. 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    Urticaria 02/24/2023    Bilateral hip pain 02/24/2023    Known medical problems 02/24/2023    Trochanteric bursitis of left hip 02/24/2023    Mixed hyperlipidemia 03/20/2023    Primary osteoarthritis of left hip 09/18/2023    Class 2 severe obesity due to excess calories with serious comorbidity and body mass index (BMI) of 38.0 to 38.9 in adult 09/18/2023    Unilateral primary osteoarthritis, left hip 09/05/2024    Pre-operative clearance 10/11/2024    Status post left hip replacement 10/25/2024    Healthcare maintenance 01/16/2025    Left hip pain 01/16/2025    Anal fissure 01/16/2025     Resolved Ambulatory Problems     Diagnosis Date Noted    No Resolved Ambulatory Problems     Past Medical History:   Diagnosis Date    Arthritis     Awareness under anesthesia     Hyperlipidemia     Wears dentures     Wears glasses        Past Surgical History  Past Surgical History:   Procedure Laterality Date    COLONOSCOPY      HIP SURGERY Left        Family History  No family history on file.  No family history of stomach or colon cancer    Social History  TOBACCO:  reports that he has been smoking cigarettes. He started smoking about 51 years ago. He has a 25.4 pack-year smoking history. He has never used smokeless  tobacco.  Patient reports cutting back on smoking since his hip surgery 3 months ago, currently smoking 1 to 2 cigars a day  ETOH:  reports current alcohol use.  Rare alcohol use  DRUGS:  reports current drug use. Drug: Marijuana.  MARITAL STATUS:   OCCUPATION:    Review of Systems  All 10 systems reviewed with the patient/family and negative except for what is mentioned in HPI      PHYSICAL EXAM  VS: /83 (BP Location: Right arm, Patient Position: Sitting)   Pulse 81   Temp 36.5 °C (97.7 °F) (Temporal)   Resp 18   Ht 1.829 m (6')   Wt 127 kg (281 lb)   SpO2 98%   BMI 38.11 kg/m²  Body mass index is 38.11 kg/m².  Physical Exam  Vitals reviewed.   Constitutional:       General: He is not in acute distress.     Appearance: Normal appearance.   HENT:      Head: Normocephalic.      Nose: Nose normal.      Mouth/Throat:      Mouth: Mucous membranes are moist.      Pharynx: Oropharynx is clear.   Eyes:      Extraocular Movements: Extraocular movements intact.      Conjunctiva/sclera: Conjunctivae normal.      Pupils: Pupils are equal, round, and reactive to light.   Cardiovascular:      Rate and Rhythm: Normal rate and regular rhythm.      Pulses: Normal pulses.      Heart sounds: Normal heart sounds.   Pulmonary:      Effort: Pulmonary effort is normal.      Breath sounds: Normal breath sounds.   Abdominal:      General: Bowel sounds are normal. There is no distension.      Palpations: Abdomen is soft.      Tenderness: There is no abdominal tenderness. There is no guarding or rebound.   Musculoskeletal:         General: Normal range of motion.      Cervical back: Normal range of motion.   Skin:     General: Skin is warm and dry.   Neurological:      General: No focal deficit present.      Mental Status: He is alert and oriented to person, place, and time.   Psychiatric:         Mood and Affect: Mood normal.         Behavior: Behavior normal.          DATA  Recent blood work and relevant radiology and  endoscopic studies were reviewed and discussed with the patient   Results from last 7 days   Lab Units 02/07/25  1036   WBC AUTO x10*3/uL 9.7   RBC AUTO x10*6/uL 5.83   HEMOGLOBIN g/dL 16.9   HEMATOCRIT % 51.4   MCV fL 88   MCHC g/dL 32.9   RDW % 14.0   PLATELETS AUTO x10*3/uL 257       Results from last 72 hours   Lab Units 02/07/25  1036   SODIUM mmol/L 141   POTASSIUM mmol/L 4.0   CHLORIDE mmol/L 106   CO2 mmol/L 27   BUN mg/dL 14   CREATININE mg/dL 1.00   CALCIUM mg/dL 9.1       Results from last 72 hours   Lab Units 02/07/25  1246   INR  1.1             RADIOLOGY REVIEW  Chest x-ray 2/7/2025:  OTHER:  No definite radiopaque structure within the trachea or central main  bronchi is identified radiographically.      IMPRESSION:  No acute radiographic abnormality.    ENDOSCOPIC REVIEW  No prior EGD    IMPRESSION/RECOMMENDATIONS  The patient is a 64 y.o. male with signficant past medical/surgical history of left total hip replacement 3 months ago who presents for food impaction.    Patient wears dentures and has a history of difficulty chewing up his food into smaller bites with food getting stuck in the past when he eats without them. The patient reports every time he has eaten without his dentures (including this time), food will get stuck in his throat.      Plan  -EGD today  -Keep n.p.o. prior to procedure  -Continue supportive care  -encouraged patient to cut up food into smaller bites and wear his dentures when eating to prevent future occurrences                Plan discussed with Dr. Pérez  (Electronically signed byREBECCA Dangelo on 2/7/2025 at 1:03 PM)         Inpatient consult to Gastroenterology  Consult performed by: REBECCA Dangelo  Consult ordered by: REBECCA Goncalves

## 2025-02-07 NOTE — ANESTHESIA PREPROCEDURE EVALUATION
Conrado Parker is a 64 y.o. male here for:      Esophagogastroduodenoscopy (EGD)  With Sorin Pérez DO  Food impaction of esophagus, initial encounter    Lab Results   Component Value Date    HGB 16.9 02/07/2025    HCT 51.4 02/07/2025    WBC 9.7 02/07/2025     02/07/2025     02/07/2025    K 4.0 02/07/2025     02/07/2025    CREATININE 1.00 02/07/2025    BUN 14 02/07/2025       Social History     Substance and Sexual Activity   Drug Use Yes    Types: Marijuana    Comment: rarely-smoking marijuana      Tobacco Use: High Risk (2/7/2025)    Patient History     Smoking Tobacco Use: Every Day     Smokeless Tobacco Use: Never     Passive Exposure: Not on file      Social History     Substance and Sexual Activity   Alcohol Use Yes    Comment: once a month        Allergies   Allergen Reactions    Other Hives     [employer] barbecue sauce       Current Outpatient Medications   Medication Instructions    celecoxib (CELEBREX) 200 mg, oral, Daily    cyclobenzaprine (FLEXERIL) 10 mg, oral, Nightly PRN    methylPREDNISolone (Medrol Dospak) 4 mg tablets Use as directed by package instructions    naloxone (Narcan) 4 mg/0.1 mL nasal spray Instill 1 spray intranasally for opioid overdose; repeat in 5 minutes if no response.    oxyCODONE-acetaminophen (Percocet) 5-325 mg tablet 1 tablet, oral, Every 6 hours PRN    tiZANidine (ZANAFLEX) 4 mg, oral, Nightly PRN       Past Medical History:   Diagnosis Date    Arthritis     hips    Awareness under anesthesia     Hyperlipidemia     Wears dentures     uppe full; lower partial    Wears glasses        Past Surgical History:   Procedure Laterality Date    COLONOSCOPY      HIP SURGERY Left        No family history on file.    Relevant Problems   Cardiac   (+) Mixed hyperlipidemia      Endocrine   (+) Class 2 severe obesity due to excess calories with serious comorbidity and body mass index (BMI) of 38.0 to 38.9 in adult      Musculoskeletal   (+)  Primary osteoarthritis of left hip   (+) Unilateral primary osteoarthritis, left hip       Visit Vitals  /83 (BP Location: Right arm, Patient Position: Sitting)   Pulse 81   Temp 36.5 °C (97.7 °F) (Temporal)   Resp 18   Ht 1.829 m (6')   Wt 127 kg (281 lb)   SpO2 98%   BMI 38.11 kg/m²   Smoking Status Every Day   BSA 2.54 m²       NPO Details:  NPO/Void Status  Date of Last Liquid: 02/07/25  Date of Last Solid: 02/06/25        Physical Exam    Airway  Mallampati: II     Cardiovascular - normal exam     Dental   (+) upper dentures, lower dentures     Pulmonary - normal exam     Abdominal - normal exam  (+) obese  Abdomen: soft             Anesthesia Plan    History of general anesthesia?: yes  History of complications of general anesthesia?: no    ASA 2 - emergent     general     intravenous induction   Anesthetic plan and risks discussed with patient.

## 2025-02-07 NOTE — ED PROVIDER NOTES
HPI   Chief Complaint   Patient presents with    food stuck in throat since last night        64-year-old male presents emergency department, states he was out to dinner last night, felt like he tried to swallow a bite that was too big that he had not chewed well enough, states it felt like it got stuck.  Describes epigastric discomfort.  States since then he has not been able to swallow anything down, states water and saliva comes back up again.    Denies any history similar.  States he does think he has GERD but is not on anything for it.      History provided by:  Patient   used: No            Patient History   Past Medical History:   Diagnosis Date    Arthritis     hips    Awareness under anesthesia     Hyperlipidemia     Wears dentures     uppe full; lower partial    Wears glasses      Past Surgical History:   Procedure Laterality Date    COLONOSCOPY      HIP SURGERY Left      No family history on file.  Social History     Tobacco Use    Smoking status: Every Day     Current packs/day: 0.00     Average packs/day: 0.5 packs/day for 50.7 years (25.4 ttl pk-yrs)     Types: Cigarettes     Start date:      Last attempt to quit: 10/2024     Years since quittin.3    Smokeless tobacco: Never   Vaping Use    Vaping status: Never Used   Substance Use Topics    Alcohol use: Yes     Comment: once a month    Drug use: Yes     Types: Marijuana     Comment: rarely-smoking marijuana       Physical Exam   ED Triage Vitals [25 1021]   Temperature Heart Rate Respirations BP   36.5 °C (97.7 °F) 81 18 175/83      Pulse Ox Temp Source Heart Rate Source Patient Position   98 % Temporal Monitor Sitting      BP Location FiO2 (%)     Right arm --       Physical Exam  Constitutional: Vitals noted, no distress. Afebrile.   Cardiovascular: Regular, rate, rhythm, no murmur.   Pulmonary: Lungs clear bilaterally with good aeration. No adventitious breath sounds.   Gastrointestinal: Soft, nonsurgical.  Nontender. No peritoneal signs. Normoactive bowel sounds.   Musculoskeletal: No peripheral edema. Negative Homans bilaterally, no cords.   Skin: No rash.   Neuro: No focal neurologic deficits, NIH score of 0.      ED Course & MDM   Diagnoses as of 02/07/25 1547   Food impaction of esophagus, initial encounter     Labs Reviewed   BASIC METABOLIC PANEL - Normal       Result Value    Glucose 95      Sodium 141      Potassium 4.0      Chloride 106      Bicarbonate 27      Anion Gap 12      Urea Nitrogen 14      Creatinine 1.00      eGFR 85      Calcium 9.1     TROPONIN I, HIGH SENSITIVITY - Normal    Troponin I, High Sensitivity 4      Narrative:     Less than 99th percentile of normal range cutoff-  Female and children under 18 years old <14 ng/L; Male <21 ng/L: Negative  Repeat testing should be performed if clinically indicated.     Female and children under 18 years old 14-50 ng/L; Male 21-50 ng/L:  Consistent with possible cardiac damage and possible increased clinical   risk. Serial measurements may help to assess extent of myocardial damage.     >50 ng/L: Consistent with cardiac damage, increased clinical risk and  myocardial infarction. Serial measurements may help assess extent of   myocardial damage.      NOTE: Children less than 1 year old may have higher baseline troponin   levels and results should be interpreted in conjunction with the overall   clinical context.     NOTE: Troponin I testing is performed using a different   testing methodology at [facility] than at other   [facility]. Direct result comparisons should only   be made within the same method.   PROTIME-INR - Normal    Protime 12.5      INR 1.1     CBC WITH AUTO DIFFERENTIAL    WBC 9.7      nRBC 0.0      RBC 5.83      Hemoglobin 16.9      Hematocrit 51.4      MCV 88      MCH 29.0      MCHC 32.9      RDW 14.0      Platelets 257      Neutrophils % 55.8      Immature Granulocytes %, Automated 0.2      Lymphocytes % 36.6       Monocytes % 5.0      Eosinophils % 2.0      Basophils % 0.4      Neutrophils Absolute 5.41      Immature Granulocytes Absolute, Automated 0.02      Lymphocytes Absolute 3.54      Monocytes Absolute 0.48      Eosinophils Absolute 0.19      Basophils Absolute 0.04     SURGICAL PATHOLOGY EXAM        XR chest 1 view   Final Result   No acute radiographic abnormality.        MACRO   None        Signed by: Esperanza Faye 2/7/2025 11:32 AM   Dictation workstation:   [id number]                    No data recorded                         Medical Decision Making  Patient presents, states ate too big of a bite of food yesterday at dinner, got stuck in his lower esophagus, since then anytime he has drink water or saliva builds up he regurgitates it.    EKG at 1044 ventricular of 75, as interpreted by me, shows normal sinus rhythm with normal axis normal interval, unremarkable ST and T wave patterns, no evidence of acute ischemia with acute findings.    CBC unremarkable, metabolic panel unremarkable, normal troponin.  Chest x-ray markable as well.    Given 1 mg of IV glucagon, 1 mg of IV Ativan but continues to be unable to tolerate p.o.    Discussed with Marita Guzman of GI service, plan of care for EGD later today, and discharge after procedure.    Procedure  Procedures     Cristiana Salvador, AUGUSITNE-CNP  02/07/25 2943

## 2025-02-07 NOTE — Clinical Note
Huddle and Timeout completed together with team. Patient wristband and SULMA information verified.  Anesthesia safety check completed

## 2025-02-07 NOTE — PROGRESS NOTES
EGD completed today, food bolus removed, okay from GI standpoint for discharge.  Can have easy to chew/soft diet.  Recommend use of dentures while eating to prevent future occurrences.     Follow-up with GI in 2 weeks, messaged outpatient office to schedule      Plan discussed with Dr. Pérez

## 2025-02-07 NOTE — ANESTHESIA POSTPROCEDURE EVALUATION
"Patient: Conrado Parker \"Umer\"    Procedure Summary       Date: 02/07/25 Room / Location: [facility]    Anesthesia Start: 1502 Anesthesia Stop:     Procedure: EGD Diagnosis: Food impaction of esophagus, initial encounter    Scheduled Providers: Sorin Pérez DO; Anastasiia Sanchez MD Responsible Provider: Raymundo Sommer MD    Anesthesia Type: general ASA Status: 2 - Emergent            Anesthesia Type: general    Vitals Value Taken Time   /72 02/07/25 1549   Temp 36.2 02/07/25 1549   Pulse 81 02/07/25 1549   Resp 18 02/07/25 1549   SpO2 100 02/07/25 1549       Anesthesia Post Evaluation    Patient location during evaluation: bedside  Patient participation: complete - patient participated  Level of consciousness: awake and alert  Pain score: 0  Pain management: adequate  Airway patency: patent  Cardiovascular status: acceptable and stable  Respiratory status: acceptable and face mask  Hydration status: stable  Postoperative Nausea and Vomiting: none      No notable events documented.    "

## 2025-02-07 NOTE — DISCHARGE INSTRUCTIONS
Patient Instructions after an endoscopy or colonoscopy      The anesthetics, sedatives or narcotics which were given to you today will be acting in your body for the next 24 hours, so you might feel a little sleepy or groggy.  This feeling should slowly wear off. Carefully read and follow the instructions.     You received sedation today:  - Do not drive or operate any machinery or power tools of any kind.   - No alcoholic beverages today, not even beer or wine.  - No over the counter medications that contain alcohol or that may cause drowsiness.  - Do not make any important decisions or sign any legal documents.    While it is common to experience mild to moderate abdominal distention, gas, or belching after your procedure, if any of these symptoms occur following discharge from the GI Lab or within one week of having your procedure, call the [facility] to be advised whether a visit to your nearest Urgent Care or Emergency Department is indicated.  Take this paper with you if you go.     - If you develop an allergic reaction to the medications that were given during your procedure such as difficulty breathing, rash, hives, severe nausea, vomiting or lightheadedness.- If you experience chest pain, shortness of breath, severe abdominal pain, fevers and chills.    -If you develop signs and symptoms of bleeding such as blood in your spit, if your stools turn black, tarry, or bloody    - If you have not urinated within 8 hours following your procedure.- If your IV site becomes painful, red, inflamed, or looks infected.      General Anesthesia Discharge Instructions    About this topic  You may need general anesthesia if you need to be asleep during a procedure. Your doctor will use drugs to block the signals that go from your nerves to your brain. Doctors give general anesthesia during a surgery or procedure to:  Allow you to sleep  Help your body be still  Relax your muscles  Help you to relax and be  pain free  Keep you from remembering the surgery  Let the doctor manage your airway, breathing, and blood flow  The doctor or nurse anesthetist gives general anesthesia by a shot into your vein. Sometimes, you may breathe in a gas through a mask placed over your face.  What care is needed at home?  Ask your doctor what you need to do when you go home. Make sure you ask questions if you do not understand what the doctor says.  Your doctor may give you drugs to prevent or treat an upset stomach from the anesthetic. Take them as ordered.  If your throat is sore, suck on ice chips or popsicles to ease throat pain.  Put 2 to 3 pillows under your head and back when you lie down to help you breathe easier.  For the first 24 to 48 hours:  Do not operate heavy or dangerous machinery.  Do not make major decisions or sign important papers. You may not be able to think clearly.  Avoid beer, wine, or mixed drinks.  You are at a higher risk of falling for at least 24 hours after general anesthesia.  Take extra care when you get up.  Do not change positions quickly.  Do not rush when you need to go to the bathroom or to answer the phone.  Ask for help if you feel unsteady when you try to walk.  Wear shoes with non-slip soles and low heels.  What follow-up care is needed?  Your doctor may ask you to come back to the office to check on your progress. Be sure to keep these visits.  If you have stitches that do not dissolve or staples, you will need to have them removed. Your doctor will want to do this in 1 to 2 weeks. If the doctor used skin glue, the glue will fall off on its own.  What drugs may be needed?  The doctor may order drugs to:  Help with pain  Treat an upset stomach or throwing up  Will physical activity be limited?  You will not be allowed to drive right away after the procedure. Ask a family member or a friend to drive you home.  Avoid trying to get out of bed without help until you are sure of your balance.  You may  have to limit your activity. Talk to your doctor about if you need to limit how much you lift or limit exercise after your procedure.  What changes to diet are needed?  Start with a light diet when you are fully awake. This includes things that are easy to swallow like soups, pudding, jello, toast, and eggs. Slowly progress to your normal diet.  What problems could happen?  Low blood pressure  Breathing problems  Upset stomach or throwing up  Dizziness  Blood clots  Infection  When do I need to call the doctor?  Trouble breathing  Upset stomach or throwing up more than 3 times in the next 2 days  Dizziness  Teach Back: Helping You Understand  The Teach Back Method helps you understand the information we are giving you. After you talk with the staff, tell them in your own words what you learned. This helps to make sure the staff has described each thing clearly. It also helps to explain things that may have been confusing. Before going home, make sure you can do these:  I can tell you about my procedure.  I can tell you if I need to follow up with my doctor.  I can tell you what is good for me to eat and drink the next day.  I can tell you what I would do if I have trouble breathing, an upset stomach, or dizziness.  Physician phone number provided to patient

## 2025-02-13 LAB
LABORATORY COMMENT REPORT: NORMAL
PATH REPORT.FINAL DX SPEC: NORMAL
PATH REPORT.GROSS SPEC: NORMAL
PATH REPORT.MICROSCOPIC SPEC OTHER STN: NORMAL
PATH REPORT.RELEVANT HX SPEC: NORMAL
PATH REPORT.TOTAL CANCER: NORMAL

## 2025-02-14 LAB
ALBUMIN SERPL-MCNC: 4 G/DL (ref 3.6–5.1)
ALP SERPL-CCNC: 70 U/L (ref 35–144)
ALT SERPL-CCNC: 24 U/L (ref 9–46)
ANION GAP SERPL CALCULATED.4IONS-SCNC: 9 MMOL/L (CALC) (ref 7–17)
AST SERPL-CCNC: 19 U/L (ref 10–35)
BASOPHILS # BLD AUTO: 51 CELLS/UL (ref 0–200)
BASOPHILS NFR BLD AUTO: 0.5 %
BILIRUB SERPL-MCNC: 0.6 MG/DL (ref 0.2–1.2)
BUN SERPL-MCNC: 13 MG/DL (ref 7–25)
CALCIUM SERPL-MCNC: 9.2 MG/DL (ref 8.6–10.3)
CHLORIDE SERPL-SCNC: 104 MMOL/L (ref 98–110)
CHOLEST SERPL-MCNC: 193 MG/DL
CHOLEST/HDLC SERPL: 4.8 (CALC)
CO2 SERPL-SCNC: 28 MMOL/L (ref 20–32)
CREAT SERPL-MCNC: 1.01 MG/DL (ref 0.7–1.35)
EGFRCR SERPLBLD CKD-EPI 2021: 84 ML/MIN/1.73M2
EOSINOPHIL # BLD AUTO: 173 CELLS/UL (ref 15–500)
EOSINOPHIL NFR BLD AUTO: 1.7 %
ERYTHROCYTE [DISTWIDTH] IN BLOOD BY AUTOMATED COUNT: 13.5 % (ref 11–15)
GLUCOSE SERPL-MCNC: 84 MG/DL (ref 65–99)
HCT VFR BLD AUTO: 49.2 % (ref 38.5–50)
HDLC SERPL-MCNC: 40 MG/DL
HGB BLD-MCNC: 16.1 G/DL (ref 13.2–17.1)
LDLC SERPL CALC-MCNC: 131 MG/DL (CALC)
LYMPHOCYTES # BLD AUTO: 3448 CELLS/UL (ref 850–3900)
LYMPHOCYTES NFR BLD AUTO: 33.8 %
MCH RBC QN AUTO: 28.7 PG (ref 27–33)
MCHC RBC AUTO-ENTMCNC: 32.7 G/DL (ref 32–36)
MCV RBC AUTO: 87.7 FL (ref 80–100)
MONOCYTES # BLD AUTO: 796 CELLS/UL (ref 200–950)
MONOCYTES NFR BLD AUTO: 7.8 %
NEUTROPHILS # BLD AUTO: 5732 CELLS/UL (ref 1500–7800)
NEUTROPHILS NFR BLD AUTO: 56.2 %
NONHDLC SERPL-MCNC: 153 MG/DL (CALC)
PLATELET # BLD AUTO: 319 THOUSAND/UL (ref 140–400)
PMV BLD REES-ECKER: 10.9 FL (ref 7.5–12.5)
POTASSIUM SERPL-SCNC: 5 MMOL/L (ref 3.5–5.3)
PROT SERPL-MCNC: 6.7 G/DL (ref 6.1–8.1)
PSA SERPL-MCNC: 2.23 NG/ML
RBC # BLD AUTO: 5.61 MILLION/UL (ref 4.2–5.8)
SODIUM SERPL-SCNC: 141 MMOL/L (ref 135–146)
TRIGL SERPL-MCNC: 110 MG/DL
WBC # BLD AUTO: 10.2 THOUSAND/UL (ref 3.8–10.8)

## 2025-02-26 LAB
ATRIAL RATE: 75 BPM
P AXIS: 50 DEGREES
P OFFSET: 187 MS
P ONSET: 136 MS
PR INTERVAL: 164 MS
Q ONSET: 218 MS
QRS COUNT: 12 BEATS
QRS DURATION: 90 MS
QT INTERVAL: 388 MS
QTC CALCULATION(BAZETT): 433 MS
QTC FREDERICIA: 417 MS
R AXIS: 33 DEGREES
T AXIS: -5 DEGREES
T OFFSET: 412 MS
VENTRICULAR RATE: 75 BPM

## 2025-03-03 ENCOUNTER — APPOINTMENT (OUTPATIENT)
Dept: GASTROENTEROLOGY | Facility: CLINIC | Age: 65
End: 2025-03-03
Payer: COMMERCIAL

## 2025-03-06 ENCOUNTER — APPOINTMENT (OUTPATIENT)
Dept: SURGERY | Facility: CLINIC | Age: 65
End: 2025-03-06
Payer: COMMERCIAL

## 2025-05-13 ENCOUNTER — OFFICE VISIT (OUTPATIENT)
Dept: ORTHOPEDIC SURGERY | Facility: CLINIC | Age: 65
End: 2025-05-13
Payer: COMMERCIAL

## 2025-05-13 ENCOUNTER — HOSPITAL ENCOUNTER (OUTPATIENT)
Dept: RADIOLOGY | Facility: CLINIC | Age: 65
Discharge: HOME | End: 2025-05-13
Payer: COMMERCIAL

## 2025-05-13 DIAGNOSIS — M16.12 PRIMARY OSTEOARTHRITIS OF LEFT HIP: ICD-10-CM

## 2025-05-13 DIAGNOSIS — M16.12 PRIMARY OSTEOARTHRITIS OF LEFT HIP: Primary | ICD-10-CM

## 2025-05-13 DIAGNOSIS — M70.62 TROCHANTERIC BURSITIS OF LEFT HIP: ICD-10-CM

## 2025-05-13 PROCEDURE — 99213 OFFICE O/P EST LOW 20 MIN: CPT | Performed by: ORTHOPAEDIC SURGERY

## 2025-05-13 PROCEDURE — 73502 X-RAY EXAM HIP UNI 2-3 VIEWS: CPT | Mod: LT

## 2025-05-13 PROCEDURE — 73502 X-RAY EXAM HIP UNI 2-3 VIEWS: CPT | Mod: LEFT SIDE | Performed by: ORTHOPAEDIC SURGERY

## 2025-05-13 PROCEDURE — 99212 OFFICE O/P EST SF 10 MIN: CPT | Performed by: ORTHOPAEDIC SURGERY

## 2025-05-13 NOTE — PROGRESS NOTES
"  Subjective    Patient ID: Conrado Austin\"    Chief Complaint:   Chief Complaint   Patient presents with    Left Hip - Follow-up     LT THR 10/25/24  Xrays today     History of present illness    64-year-old gentleman presented clinic today for follow-up evaluation status post left total hip replacement.  He continues to have left thigh pain that radiates down to the left knee at times.  He has no groin pain.  He complains of numbness tingling shooting down the left lower extremity extending all the way down the left foot at times.  The symptoms are worse with extended weightbearing activity.  He states he has been using a cane for long distance walking which is a regression for him.  The Medrol Dosepak did not really do much for him.  He had an allergic reaction to pain medication trying to help with this.      Past medical , Surgical, Family and social history reviewed.      Physical exam  General: No acute distress and breathing comfortably.  Patient is pleasant and cooperative with the examination.    Extremity  Left hip is neurovascular intact.  He has good range of motion internal/external rotation getting better.  Negative straight leg raise test.  Compartment soft.  Capillary refill within normal is distally.  Sensation is intact.  No calf swelling or tenderness.  Minimal tenderness palpation over the posterior lateral left hip region.  No groin pain.    Diagnostics  [ none]  Imaging  No results found.    Cardiology, Vascular, and Other Imaging  XR hip left with pelvis when performed 2 or 3 views  Result Date: 5/13/2025  Interpreted By:  Marya Tavarez, STUDY: XR HIP LEFT WITH PELVIS WHEN PERFORMED 2 OR 3 VIEWS; 5/13/2025 8:27 am   INDICATION: Signs/Symptoms:pain.   ACCESSION NUMBER(S): [id number]   ORDERING CLINICIAN: MARYA TAVAREZ   FINDINGS: Two views show patient status post total hip replacement in good alignment.  No signs of fracture dislocation or other bony abnormality       Signed by: " Jaylen Tavarez 5/13/2025 8:31 AM Dictation workstation:   [id number]         Procedure  [ none]    Assessment  Status post left total hip replacement  Lumbar radiculopathy left lower extremity    Treatment plan  1.  At this time with a long discussion regards to continued conservative treatment options.  2.  Since he is having more neurogenic symptoms at this time and it is getting worse we will go ahead and have him follow-up with our orthopedic spine team for further evaluation.  3.  Will follow-up with us in 3 months with new x-rays left hip at that time if he has increased pain.  For complete plan and/or surgical details, please refer to Dr. Tavarez's portion of the split/shared dictation.  4.  All of the patient's questions were answered.    Orders Placed This Encounter    XR hip left with pelvis when performed 2 or 3 views       This note was prepared using voice recognition software.  The details of this note are correct and have been reviewed, and corrected to the best of my ability.  Some grammatical areas may persist related to the Dragon software    Luis Diaz PA-C, [facility]  [facility] [place]    [phone]    In a face-to-face encounter performed today, I Jaylen Tavarez MD performed a history and physical examination, discussed pertinent diagnostic studies if indicated, and discussed diagnosis and management strategies with both the patient and the midlevel provider.  I reviewed the midlevel's note and agree with the documented findings and plan of care.  Greater than 50% of the evaluation and treatment decision was performed by the physician myself during today's visit.    84-year-old male here for follow-up he had left total hip replacement in October 2024 he continues to complain of pain along the left thigh he also has numbness and tingling occasionally on the top of his foot into his knee.  Seems to be worse with activity also bothers him at night.  The Medrol  Dosepak helped some but he did have a reaction to the pain medications unable to take it.  States his pain level today is a 3 out of 10 sometimes up to a 10 out of 10.  Examination incisions well-healed no pain with internal/external rotation straight leg raise testing is negative he is got good muscle strength distally brisk cap refill x-rays are obtained today see my dictated x-ray report.  Impression is persistent left leg pain with neurogenic symptoms.  I recommend he continue working on his exercise program of also recommended an evaluation with one of our back specialist for further evaluation as majority of symptoms seem to be more neurogenic like anything else.          This note was prepared using voice recognition software.  The details of this note are correct and have been reviewed, and corrected to the best of my ability.  Some grammatical areas may persist related to the Dragon software    Jaylen Tavarez MD  Senior Attending Physician  [facility]    [phone]

## 2025-06-04 ENCOUNTER — HOSPITAL ENCOUNTER (OUTPATIENT)
Dept: RADIOLOGY | Facility: CLINIC | Age: 65
Discharge: HOME | End: 2025-06-04
Payer: COMMERCIAL

## 2025-06-04 ENCOUNTER — OFFICE VISIT (OUTPATIENT)
Dept: ORTHOPEDIC SURGERY | Facility: CLINIC | Age: 65
End: 2025-06-04
Payer: COMMERCIAL

## 2025-06-04 DIAGNOSIS — M54.50 LUMBAR PAIN: ICD-10-CM

## 2025-06-04 DIAGNOSIS — I73.9 VASCULAR CLAUDICATION: ICD-10-CM

## 2025-06-04 DIAGNOSIS — M54.10 BACK PAIN WITH RADICULOPATHY: Primary | ICD-10-CM

## 2025-06-04 PROCEDURE — 72110 X-RAY EXAM L-2 SPINE 4/>VWS: CPT | Performed by: RADIOLOGY

## 2025-06-04 PROCEDURE — 99214 OFFICE O/P EST MOD 30 MIN: CPT | Performed by: PHYSICIAN ASSISTANT

## 2025-06-04 PROCEDURE — 72120 X-RAY BEND ONLY L-S SPINE: CPT

## 2025-06-04 PROCEDURE — 99212 OFFICE O/P EST SF 10 MIN: CPT | Performed by: PHYSICIAN ASSISTANT

## 2025-06-04 NOTE — PROGRESS NOTES
New patient to establish to the practice comes the office complaining of not so much in the way of any back pain.  He is get some left thigh pain.  He had his hip done in October with Dr. Tavarez he did not think it was coming from the hip area look good on x-ray.  If he sits in his truck he really will not have any symptoms.  But other other times.  If he is standing the pain will kind of increase.  If he sits down the counter goes away almost immediately.  Denies any real radicular pain down the legs also got some numbness and tingling in the top of his left foot.  No bowel or bladder complaints no saddle anesthesia no gait or balance changes no trauma accidents or falls no fevers chills nausea vomiting or night sweats.    Looked at patient's recent blood work.  Checked a hemoglobin A1c was 5.4 looked at primary doctors notes we checked medication list see if he is on a statin because muscular aches and pain he is not look to see if he is on any major blood thinners I do not see that he is.    Physical exam: Well-nourished, well kept.  No lymphangitis or lymphadenopathy in the examined extremities.  Good perfusion to the extremities ×4.  Radial and dorsalis pedis pulses 2+.  Capillary refill to all 4 digits brisk.  No distal edema x 4.  Little shininess noted to the lower extremities bilateral lower legs.  Patient moving cervical spine without any major issues.  No instability.  Full range of motion lumbar spine no instability.  Moving upper extremities by difficulty motor strength intact.  Examination of the lower extremities reveals no point tenderness, swelling, or deformity.  Range of motion of the hips, knees, and ankles are full without crepitance, instability, or exacerbation of pain.  Strength is 5/5 throughout.  No redness, abrasions, or lesions on all 4 extremities, head and neck, or trunk.  Patient neurologically intact    X-ray: X-ray lumbar spine taken today and reviewed AP lateral flexion-extension  shows some degenerative changes L5-S1.  No fractures dislocations bony lytic lesions develop very mild scoliosis left iliac crest is a little bit higher left artificial hip noted.  No abnormalities noted on that x-ray.  We looked at hip x-rays that were taken prior with Dr. Tavarez that report reviewed as well showing no major abnormalities.    Assessment: This a patient with not much in the way to back pain the skin pain into his left thigh numbness and tingling also to the top of his foot affecting his daily and bodily function.  He also describing a little bit of claudication type symptoms may be vascular oriented on not quite sure.  A little shininess to the skin on the lower extremities.  Patient just finished physical therapy and he is got handouts he is continue with his exercises and strengthening exercises is not helping.  He said over-the-counter meds and prescription meds has not helped also.  Those include some steroids and Celebrex Tylenol Advil ibuprofen.    Plan: I am in to get a vascular duplex study with ABIs to look bilateral lower extremities and MRI at [facility] for diagnostic purposes we will follow-up with those films.

## 2025-08-13 ENCOUNTER — HOSPITAL ENCOUNTER (OUTPATIENT)
Dept: RADIOLOGY | Facility: CLINIC | Age: 65
Discharge: HOME | End: 2025-08-13
Payer: COMMERCIAL

## 2025-08-13 ENCOUNTER — OFFICE VISIT (OUTPATIENT)
Dept: ORTHOPEDIC SURGERY | Facility: CLINIC | Age: 65
End: 2025-08-13
Payer: COMMERCIAL

## 2025-08-13 DIAGNOSIS — Z96.642 STATUS POST LEFT HIP REPLACEMENT: Primary | ICD-10-CM

## 2025-08-13 DIAGNOSIS — M70.62 TROCHANTERIC BURSITIS OF LEFT HIP: ICD-10-CM

## 2025-08-13 DIAGNOSIS — Z96.642 STATUS POST LEFT HIP REPLACEMENT: ICD-10-CM

## 2025-08-13 PROCEDURE — 73502 X-RAY EXAM HIP UNI 2-3 VIEWS: CPT | Mod: LT

## 2025-08-13 PROCEDURE — 99212 OFFICE O/P EST SF 10 MIN: CPT | Performed by: ORTHOPAEDIC SURGERY

## 2025-08-13 PROCEDURE — 99213 OFFICE O/P EST LOW 20 MIN: CPT | Performed by: ORTHOPAEDIC SURGERY

## 2025-08-13 PROCEDURE — 73502 X-RAY EXAM HIP UNI 2-3 VIEWS: CPT | Mod: LEFT SIDE | Performed by: ORTHOPAEDIC SURGERY

## (undated) DEVICE — BATH BLANKET STERILE

## (undated) DEVICE — STRAP, ARM BOARD, 32 X 1.5

## (undated) DEVICE — SOLUTION, INJECTION, USP, SODIUM CHLORIDE 0.9%, .9, NACL, 1000 ML, BAG

## (undated) DEVICE — ADHESIVE, SKIN, DERMABOND ADVANCED, 15CM, PEN-STYLE

## (undated) DEVICE — PILLOW, ABDUCTION, LARGE, 25 X 18 X 6.25 IN

## (undated) DEVICE — Device

## (undated) DEVICE — STRAP, VELCRO, BODY, 4 X 60IN, NS

## (undated) DEVICE — SUTURE, ETHIBOND, P2, V-37, 30 IN, GREEN

## (undated) DEVICE — DRESSING, MEPILEX BORDER, POST-OP AG, 4 X 10 IN

## (undated) DEVICE — PROTECTOR, NERVE, ULNAR, PINK

## (undated) DEVICE — SUTURE, VICRYL, 1, 36 IN, V-34, VIOLET

## (undated) DEVICE — GOWN, SURGICAL, ROYAL SILK, LG, STERILE

## (undated) DEVICE — NEEDLE, SPINAL, 20 G X 3.5 IN, YELLOW HUB

## (undated) DEVICE — RETRIEVER, SUTURE

## (undated) DEVICE — CAUTERY, PENCIL, PUSH BUTTON, SMOKE EVAC, 70MM

## (undated) DEVICE — TOWEL PACK, STERILE, 16X24, XRAY DETECTABLE, BLUE, 4/PK

## (undated) DEVICE — PREP, IODOPHOR, W/ALCOHOL, DURAPREP, W/APPLICATOR, 26 CC

## (undated) DEVICE — BLADE, SAW, RECIPROCATING, SHORT

## (undated) DEVICE — WOUND SYSTEM, DEBRIDEMENT & CLEANING, O.R DUOPAK

## (undated) DEVICE — SUTURE, MONOCRYL, 3-0, 36 IN, CT-1, UNDYED

## (undated) DEVICE — POSITIONER, CRADLE, HEAD, MEDC, FOAM SLOTTED

## (undated) DEVICE — MANIFOLD, 4 PORT NEPTUNE STANDARD

## (undated) DEVICE — HOOD, SURGICAL, FLYTE, T7

## (undated) DEVICE — SUTURE, MONOCRYL, 3-0, 18 IN, PS2, UNDYED

## (undated) DEVICE — DRAPE, INCISE, ANTIMICROBIAL, IOBAN 2, LARGE, 17 X 23 IN, DISPOSABLE, STERILE

## (undated) DEVICE — GLOVE, SURGICAL, PROTEXIS PI , 7.5, PF, LF